# Patient Record
Sex: MALE | Race: WHITE | Employment: OTHER | URBAN - METROPOLITAN AREA
[De-identification: names, ages, dates, MRNs, and addresses within clinical notes are randomized per-mention and may not be internally consistent; named-entity substitution may affect disease eponyms.]

---

## 2017-01-05 PROCEDURE — 84153 ASSAY OF PSA TOTAL: CPT | Performed by: UROLOGY

## 2017-01-05 PROCEDURE — 80177 DRUG SCRN QUAN LEVETIRACETAM: CPT | Performed by: NURSE PRACTITIONER

## 2017-01-05 PROCEDURE — 84154 ASSAY OF PSA FREE: CPT | Performed by: UROLOGY

## 2017-01-12 PROCEDURE — 84702 CHORIONIC GONADOTROPIN TEST: CPT | Performed by: INTERNAL MEDICINE

## 2017-01-12 PROCEDURE — 83002 ASSAY OF GONADOTROPIN (LH): CPT | Performed by: INTERNAL MEDICINE

## 2017-01-12 PROCEDURE — 84402 ASSAY OF FREE TESTOSTERONE: CPT | Performed by: INTERNAL MEDICINE

## 2017-01-12 PROCEDURE — 84146 ASSAY OF PROLACTIN: CPT | Performed by: INTERNAL MEDICINE

## 2017-01-12 PROCEDURE — 84403 ASSAY OF TOTAL TESTOSTERONE: CPT | Performed by: INTERNAL MEDICINE

## 2017-01-12 PROCEDURE — 83001 ASSAY OF GONADOTROPIN (FSH): CPT | Performed by: INTERNAL MEDICINE

## 2017-04-14 PROCEDURE — 36415 COLL VENOUS BLD VENIPUNCTURE: CPT | Performed by: UROLOGY

## 2017-04-14 PROCEDURE — 84153 ASSAY OF PSA TOTAL: CPT | Performed by: UROLOGY

## 2017-11-02 PROBLEM — M48.02 CERVICAL STENOSIS OF SPINAL CANAL: Status: ACTIVE | Noted: 2017-11-02

## 2017-11-02 PROBLEM — M43.12 SPONDYLOLISTHESIS OF CERVICAL REGION: Status: ACTIVE | Noted: 2017-11-02

## 2018-02-06 PROBLEM — I48.91 ATRIAL FIBRILLATION, UNSPECIFIED TYPE (HCC): Status: ACTIVE | Noted: 2018-02-06

## 2018-03-06 PROBLEM — G89.29 CHRONIC BILATERAL LOW BACK PAIN WITHOUT SCIATICA: Status: ACTIVE | Noted: 2018-03-06

## 2018-03-06 PROBLEM — M54.50 CHRONIC BILATERAL LOW BACK PAIN WITHOUT SCIATICA: Status: ACTIVE | Noted: 2018-03-06

## 2018-03-06 PROBLEM — Z98.890 HISTORY OF LUMBAR SURGERY: Status: ACTIVE | Noted: 2018-03-06

## 2018-03-23 PROCEDURE — 84153 ASSAY OF PSA TOTAL: CPT | Performed by: INTERNAL MEDICINE

## 2018-03-23 PROCEDURE — 84154 ASSAY OF PSA FREE: CPT | Performed by: INTERNAL MEDICINE

## 2018-03-23 PROCEDURE — 80177 DRUG SCRN QUAN LEVETIRACETAM: CPT | Performed by: NURSE PRACTITIONER

## 2018-03-23 PROCEDURE — 81003 URINALYSIS AUTO W/O SCOPE: CPT | Performed by: INTERNAL MEDICINE

## 2018-03-29 PROCEDURE — 82746 ASSAY OF FOLIC ACID SERUM: CPT | Performed by: INTERNAL MEDICINE

## 2018-03-29 PROCEDURE — 82607 VITAMIN B-12: CPT | Performed by: INTERNAL MEDICINE

## 2018-03-29 PROCEDURE — 84165 PROTEIN E-PHORESIS SERUM: CPT | Performed by: INTERNAL MEDICINE

## 2018-03-29 PROCEDURE — 83883 ASSAY NEPHELOMETRY NOT SPEC: CPT | Performed by: INTERNAL MEDICINE

## 2018-03-29 PROCEDURE — 86334 IMMUNOFIX E-PHORESIS SERUM: CPT | Performed by: INTERNAL MEDICINE

## 2018-05-15 PROBLEM — G89.29 OTHER CHRONIC PAIN: Status: ACTIVE | Noted: 2018-05-15

## 2018-06-14 PROCEDURE — 80177 DRUG SCRN QUAN LEVETIRACETAM: CPT | Performed by: NURSE PRACTITIONER

## 2018-06-14 PROCEDURE — 36415 COLL VENOUS BLD VENIPUNCTURE: CPT | Performed by: NURSE PRACTITIONER

## 2019-01-10 ENCOUNTER — HOSPITAL ENCOUNTER (EMERGENCY)
Facility: HOSPITAL | Age: 74
Discharge: HOME | End: 2019-01-10
Attending: EMERGENCY MEDICINE
Payer: MEDICARE

## 2019-01-10 ENCOUNTER — APPOINTMENT (OUTPATIENT)
Dept: CT IMAGING | Facility: HOSPITAL | Age: 74
End: 2019-01-10
Attending: EMERGENCY MEDICINE
Payer: MEDICARE

## 2019-01-10 ENCOUNTER — APPOINTMENT (OUTPATIENT)
Dept: GENERAL RADIOLOGY | Facility: HOSPITAL | Age: 74
End: 2019-01-10
Attending: EMERGENCY MEDICINE
Payer: MEDICARE

## 2019-01-10 VITALS
DIASTOLIC BLOOD PRESSURE: 72 MMHG | HEART RATE: 59 BPM | BODY MASS INDEX: 23.03 KG/M2 | RESPIRATION RATE: 18 BRPM | TEMPERATURE: 98 F | OXYGEN SATURATION: 95 % | WEIGHT: 170 LBS | SYSTOLIC BLOOD PRESSURE: 117 MMHG | HEIGHT: 72 IN

## 2019-01-10 DIAGNOSIS — R07.81 PLEURITIC CHEST PAIN: ICD-10-CM

## 2019-01-10 DIAGNOSIS — J18.9 COMMUNITY ACQUIRED PNEUMONIA OF LEFT UPPER LOBE OF LUNG: Primary | ICD-10-CM

## 2019-01-10 DIAGNOSIS — R93.89 ABNORMAL CT OF THE CHEST: ICD-10-CM

## 2019-01-10 LAB
ALBUMIN SERPL-MCNC: 3.5 G/DL (ref 3.1–4.5)
ALBUMIN/GLOB SERPL: 0.9 {RATIO} (ref 1–2)
ALP LIVER SERPL-CCNC: 88 U/L (ref 45–117)
ALT SERPL-CCNC: 22 U/L (ref 17–63)
ANION GAP SERPL CALC-SCNC: 6 MMOL/L (ref 0–18)
AST SERPL-CCNC: 14 U/L (ref 15–41)
ATRIAL RATE: 71 BPM
BASOPHILS # BLD AUTO: 0.02 X10(3) UL (ref 0–0.1)
BASOPHILS NFR BLD AUTO: 0.2 %
BILIRUB SERPL-MCNC: 1.2 MG/DL (ref 0.1–2)
BUN BLD-MCNC: 23 MG/DL (ref 8–20)
BUN/CREAT SERPL: 17.4 (ref 10–20)
CALCIUM BLD-MCNC: 8.9 MG/DL (ref 8.3–10.3)
CHLORIDE SERPL-SCNC: 103 MMOL/L (ref 101–111)
CO2 SERPL-SCNC: 28 MMOL/L (ref 22–32)
CREAT BLD-MCNC: 1.32 MG/DL (ref 0.7–1.3)
EOSINOPHIL # BLD AUTO: 0.16 X10(3) UL (ref 0–0.3)
EOSINOPHIL NFR BLD AUTO: 1.3 %
ERYTHROCYTE [DISTWIDTH] IN BLOOD BY AUTOMATED COUNT: 11.7 % (ref 11.5–16)
GLOBULIN PLAS-MCNC: 3.9 G/DL (ref 2.8–4.4)
GLUCOSE BLD-MCNC: 147 MG/DL (ref 70–99)
HCT VFR BLD AUTO: 43.3 % (ref 37–53)
HGB BLD-MCNC: 14.7 G/DL (ref 13–17)
IMMATURE GRANULOCYTE COUNT: 0.08 X10(3) UL (ref 0–1)
IMMATURE GRANULOCYTE RATIO %: 0.6 %
LYMPHOCYTES # BLD AUTO: 0.78 X10(3) UL (ref 0.9–4)
LYMPHOCYTES NFR BLD AUTO: 6.3 %
M PROTEIN MFR SERPL ELPH: 7.4 G/DL (ref 6.4–8.2)
MCH RBC QN AUTO: 33.4 PG (ref 27–33.2)
MCHC RBC AUTO-ENTMCNC: 33.9 G/DL (ref 31–37)
MCV RBC AUTO: 98.4 FL (ref 80–99)
MONOCYTES # BLD AUTO: 1.53 X10(3) UL (ref 0.1–1)
MONOCYTES NFR BLD AUTO: 12.4 %
NEUTROPHIL ABS PRELIM: 9.74 X10 (3) UL (ref 1.3–6.7)
NEUTROPHILS # BLD AUTO: 9.74 X10(3) UL (ref 1.3–6.7)
NEUTROPHILS NFR BLD AUTO: 79.2 %
OSMOLALITY SERPL CALC.SUM OF ELEC: 290 MOSM/KG (ref 275–295)
P AXIS: 45 DEGREES
P-R INTERVAL: 194 MS
PLATELET # BLD AUTO: 188 10(3)UL (ref 150–450)
POTASSIUM SERPL-SCNC: 4.1 MMOL/L (ref 3.6–5.1)
Q-T INTERVAL: 380 MS
QRS DURATION: 104 MS
QTC CALCULATION (BEZET): 412 MS
R AXIS: -45 DEGREES
RBC # BLD AUTO: 4.4 X10(6)UL (ref 3.8–5.8)
RED CELL DISTRIBUTION WIDTH-SD: 42.5 FL (ref 35.1–46.3)
SODIUM SERPL-SCNC: 137 MMOL/L (ref 136–144)
T AXIS: 23 DEGREES
TROPONIN I SERPL-MCNC: <0.046 NG/ML (ref ?–0.05)
VENTRICULAR RATE: 71 BPM
WBC # BLD AUTO: 12.3 X10(3) UL (ref 4–13)

## 2019-01-10 PROCEDURE — 99285 EMERGENCY DEPT VISIT HI MDM: CPT | Performed by: EMERGENCY MEDICINE

## 2019-01-10 PROCEDURE — 93010 ELECTROCARDIOGRAM REPORT: CPT | Performed by: EMERGENCY MEDICINE

## 2019-01-10 PROCEDURE — 80053 COMPREHEN METABOLIC PANEL: CPT | Performed by: EMERGENCY MEDICINE

## 2019-01-10 PROCEDURE — 71046 X-RAY EXAM CHEST 2 VIEWS: CPT | Performed by: EMERGENCY MEDICINE

## 2019-01-10 PROCEDURE — 96360 HYDRATION IV INFUSION INIT: CPT | Performed by: EMERGENCY MEDICINE

## 2019-01-10 PROCEDURE — 93005 ELECTROCARDIOGRAM TRACING: CPT

## 2019-01-10 PROCEDURE — 84484 ASSAY OF TROPONIN QUANT: CPT | Performed by: EMERGENCY MEDICINE

## 2019-01-10 PROCEDURE — 71275 CT ANGIOGRAPHY CHEST: CPT | Performed by: EMERGENCY MEDICINE

## 2019-01-10 PROCEDURE — 85025 COMPLETE CBC W/AUTO DIFF WBC: CPT | Performed by: EMERGENCY MEDICINE

## 2019-01-10 PROCEDURE — 96361 HYDRATE IV INFUSION ADD-ON: CPT | Performed by: EMERGENCY MEDICINE

## 2019-01-10 RX ORDER — SODIUM CHLORIDE 9 MG/ML
INJECTION, SOLUTION INTRAVENOUS ONCE
Status: COMPLETED | OUTPATIENT
Start: 2019-01-10 | End: 2019-01-10

## 2019-01-10 RX ORDER — VITAMIN E 268 MG
400 CAPSULE ORAL DAILY
Status: ON HOLD | COMMUNITY
End: 2019-04-18

## 2019-01-10 RX ORDER — LEVOFLOXACIN 500 MG/1
500 TABLET, FILM COATED ORAL DAILY
Qty: 10 TABLET | Refills: 0 | Status: SHIPPED | OUTPATIENT
Start: 2019-01-10 | End: 2019-01-20

## 2019-01-10 NOTE — ED PROVIDER NOTES
Patient Seen in: [facility] Emergency Department    History   Patient presents with:  Upper Extremity Injury (musculoskeletal)    Stated Complaint: Upper extremity pain     HPI    Patient is a pleasant 16-year-old male, presenting for evaluation of at [address] [place] [address], POSSIBLE BIOPSY, POSSIBLE POLYPECTOMY [address]16 N/A 12/22/2009    Performed by Ashok Claude, MD at [facility]   • ESOPHAGOGASTRODUODENOSCOPY (EGD) N/A 1/29/2015    Performed by Ashok Claude, MD 53.8      Smokeless tobacco: Never Used    Alcohol use: Yes      Comment: SOCIAL    Drug use: No      Review of Systems    Positive for stated complaint: Upper extremity pain   Other systems are as noted in HPI. Constitutional and vital signs reviewed. components within normal limits   CBC W/ DIFFERENTIAL - Abnormal; Notable for the following components:    MCH 33.4 (*)     Neutrophil Absolute Prelim 9.74 (*)     Neutrophil Absolute 9.74 (*)     Lymphocyte Absolute 0.78 (*)     Monocyte Absolute 1.53 (*) Upper extremity pain  TECHNIQUE:  IV contrast-enhanced multislice CT angiography is performed through the pulmonary arterial anatomy. 3D volume renderings are generated. Dose reduction techniques were used.  Dose information is transmitted to the [facility] established, and blood was drawn and sent to the laboratory for further evaluation. The patient was attached to a cardiac monitor. An EKG was obtained and reviewed as noted above.  Patient was observed while the laboratory and radiology studies were obtaine

## 2019-01-10 NOTE — ED NOTES
Patient states has been more active recently taking the tree down and lifting his grandson the last few days.

## 2019-01-10 NOTE — ED INITIAL ASSESSMENT (HPI)
Patient here with left pectoral pain when he uses his left arm or takes a deep breath otherwise he experiences no pain at rest.  Onset 3 days ago, denies any shortness of breath, denies any radiation of pain.

## 2019-01-24 ENCOUNTER — HOSPITAL ENCOUNTER (INPATIENT)
Facility: HOSPITAL | Age: 74
LOS: 2 days | Discharge: HOME | DRG: 195 | End: 2019-01-26
Attending: INTERNAL MEDICINE | Admitting: INTERNAL MEDICINE
Payer: MEDICARE

## 2019-01-24 PROBLEM — R91.8 LUNG INFILTRATE: Status: ACTIVE | Noted: 2019-01-24

## 2019-01-24 LAB
ANION GAP SERPL CALC-SCNC: 6 MMOL/L (ref 0–18)
BASOPHILS # BLD AUTO: 0.02 X10(3) UL (ref 0–0.1)
BASOPHILS NFR BLD AUTO: 0.2 %
BUN BLD-MCNC: 24 MG/DL (ref 8–20)
BUN/CREAT SERPL: 18.5 (ref 10–20)
CALCIUM BLD-MCNC: 8.8 MG/DL (ref 8.3–10.3)
CHLORIDE SERPL-SCNC: 107 MMOL/L (ref 101–111)
CO2 SERPL-SCNC: 28 MMOL/L (ref 22–32)
CREAT BLD-MCNC: 1.3 MG/DL (ref 0.7–1.3)
EOSINOPHIL # BLD AUTO: 0.19 X10(3) UL (ref 0–0.3)
EOSINOPHIL NFR BLD AUTO: 2.1 %
ERYTHROCYTE [DISTWIDTH] IN BLOOD BY AUTOMATED COUNT: 11.8 % (ref 11.5–16)
GLUCOSE BLD-MCNC: 155 MG/DL (ref 70–99)
HCT VFR BLD AUTO: 41 % (ref 37–53)
HGB BLD-MCNC: 14.2 G/DL (ref 13–17)
IMMATURE GRANULOCYTE COUNT: 0.04 X10(3) UL (ref 0–1)
IMMATURE GRANULOCYTE RATIO %: 0.4 %
LYMPHOCYTES # BLD AUTO: 1.14 X10(3) UL (ref 0.9–4)
LYMPHOCYTES NFR BLD AUTO: 12.4 %
MCH RBC QN AUTO: 33.7 PG (ref 27–33.2)
MCHC RBC AUTO-ENTMCNC: 34.6 G/DL (ref 31–37)
MCV RBC AUTO: 97.4 FL (ref 80–99)
MONOCYTES # BLD AUTO: 0.62 X10(3) UL (ref 0.1–1)
MONOCYTES NFR BLD AUTO: 6.7 %
NEUTROPHIL ABS PRELIM: 7.22 X10 (3) UL (ref 1.3–6.7)
NEUTROPHILS # BLD AUTO: 7.22 X10(3) UL (ref 1.3–6.7)
NEUTROPHILS NFR BLD AUTO: 78.2 %
OSMOLALITY SERPL CALC.SUM OF ELEC: 299 MOSM/KG (ref 275–295)
PLATELET # BLD AUTO: 342 10(3)UL (ref 150–450)
POTASSIUM SERPL-SCNC: 3.7 MMOL/L (ref 3.6–5.1)
RBC # BLD AUTO: 4.21 X10(6)UL (ref 3.8–5.8)
RED CELL DISTRIBUTION WIDTH-SD: 41.6 FL (ref 35.1–46.3)
SODIUM SERPL-SCNC: 141 MMOL/L (ref 136–144)
WBC # BLD AUTO: 9.2 X10(3) UL (ref 4–13)

## 2019-01-24 PROCEDURE — 86641 CRYPTOCOCCUS ANTIBODY: CPT | Performed by: INTERNAL MEDICINE

## 2019-01-24 PROCEDURE — 85025 COMPLETE CBC W/AUTO DIFF WBC: CPT | Performed by: INTERNAL MEDICINE

## 2019-01-24 PROCEDURE — 86612 BLASTOMYCES ANTIBODY: CPT | Performed by: INTERNAL MEDICINE

## 2019-01-24 PROCEDURE — 86698 HISTOPLASMA ANTIBODY: CPT | Performed by: INTERNAL MEDICINE

## 2019-01-24 PROCEDURE — 86606 ASPERGILLUS ANTIBODY: CPT | Performed by: INTERNAL MEDICINE

## 2019-01-24 PROCEDURE — 87385 HISTOPLASMA CAPSUL AG IA: CPT | Performed by: INTERNAL MEDICINE

## 2019-01-24 PROCEDURE — 94640 AIRWAY INHALATION TREATMENT: CPT

## 2019-01-24 PROCEDURE — 86635 COCCIDIOIDES ANTIBODY: CPT | Performed by: INTERNAL MEDICINE

## 2019-01-24 PROCEDURE — 80048 BASIC METABOLIC PNL TOTAL CA: CPT | Performed by: INTERNAL MEDICINE

## 2019-01-24 PROCEDURE — 86480 TB TEST CELL IMMUN MEASURE: CPT | Performed by: INTERNAL MEDICINE

## 2019-01-24 RX ORDER — FLUTICASONE PROPIONATE 50 MCG
2 SPRAY, SUSPENSION (ML) NASAL NIGHTLY
Status: DISCONTINUED | OUTPATIENT
Start: 2019-01-24 | End: 2019-01-26

## 2019-01-24 RX ORDER — CARVEDILOL 6.25 MG/1
6.25 TABLET ORAL 2 TIMES DAILY WITH MEALS
Status: DISCONTINUED | OUTPATIENT
Start: 2019-01-24 | End: 2019-01-26

## 2019-01-24 RX ORDER — LEVETIRACETAM 500 MG/1
750 TABLET ORAL 2 TIMES DAILY
COMMUNITY
End: 2019-04-17

## 2019-01-24 RX ORDER — ACETAMINOPHEN 325 MG/1
650 TABLET ORAL EVERY 6 HOURS PRN
Status: DISCONTINUED | OUTPATIENT
Start: 2019-01-24 | End: 2019-01-26

## 2019-01-24 RX ORDER — ENOXAPARIN SODIUM 100 MG/ML
40 INJECTION SUBCUTANEOUS DAILY
Status: DISCONTINUED | OUTPATIENT
Start: 2019-01-24 | End: 2019-01-24

## 2019-01-24 RX ORDER — PANTOPRAZOLE SODIUM 40 MG/1
40 TABLET, DELAYED RELEASE ORAL
Status: DISCONTINUED | OUTPATIENT
Start: 2019-01-25 | End: 2019-01-26

## 2019-01-24 RX ORDER — ROSUVASTATIN CALCIUM 20 MG/1
10 TABLET, COATED ORAL NIGHTLY
Status: DISCONTINUED | OUTPATIENT
Start: 2019-01-24 | End: 2019-01-26

## 2019-01-24 RX ORDER — LEVETIRACETAM 500 MG/1
500 TABLET ORAL 2 TIMES DAILY
Status: DISCONTINUED | OUTPATIENT
Start: 2019-01-24 | End: 2019-01-24

## 2019-01-24 RX ORDER — POTASSIUM CHLORIDE 20 MEQ/1
40 TABLET, EXTENDED RELEASE ORAL ONCE
Status: COMPLETED | OUTPATIENT
Start: 2019-01-24 | End: 2019-01-24

## 2019-01-24 RX ORDER — ASPIRIN 81 MG/1
81 TABLET ORAL NIGHTLY
Status: DISCONTINUED | OUTPATIENT
Start: 2019-01-24 | End: 2019-01-26

## 2019-01-24 NOTE — CONSULTS
Pulmonary H&P/Consult     NAME: [address]: [id number] - MRN: [id number] - Age: 75year old - :  1945    Date of Admission: 2019 12:41 PM  Admission Diagnosis: pneumonia,r/o TB    Assessment/Plan:  1.  Pneumonia - completed course of lev • BACK SURGERY  8/2/12    L2-4 mis PLIF AND TWO OTHERS   • BACK SURGERY  11/19/2015   • CERVICAL EPIDURAL N/A 11/2/2017    Performed by Fabiola Drake MD at [address]   • CERVICAL EPIDURAL N/A 6/28/2013    Performed by Jagdish Morataya [place] [facility]   • TRANSFORAMINAL EPIDURAL - LUMBAR Right 2/14/2014    Performed by Dash Williamson MD at [facility]   • TRANSFORAMINAL EPIDURAL - LUMBAR Right 1/31/2014    Performed by Tiffany Rodrigez MD at [facility]

## 2019-01-24 NOTE — CONSULTS
INFECTIOUS DISEASE CONSULTATION    Justice Platt Patient Status:  Inpatient    1945 MRN [id number]   [facility] 7NE-A Attending Natalia Chapman MD   Hosp Day # 0 PCP Denise Holliday, POSSIBLE POLYPECTOMY 36440 N/A 12/22/2009    Performed by Kurt Goode MD at [address] [place] [address]   • ESOPHAGOGASTRODUODENOSCOPY (EGD) N/A 1/29/2015    Performed by Kurt Goode MD at [facility] ENDOSCOPY   • ESOPHAGOGASTRODUODENOSCOPY, POSSIBLE DILA tobacco. He reports that he drinks alcohol. He reports that he does not use drugs.     Allergies:    Ampicillin              RASH    Comment:Throat tight  Vibramycin Iv [Alba*    RASH    Medications:    Current Facility-Administered Medications:   •  Flutic distress. Alert and oriented x 3. Vital signs: Pulse:  [69] 69  Resp:  [14] 14  BP: (122)/(82) 122/82  HEENT: Moist mucous membranes. Extraocular muscles are intact. Neck: No lymphadenopathy. supple, no masses  Respiratory: Clear to auscultation bilateral Ivett Vaca  [phone]

## 2019-01-25 LAB
BASOPHIL BRONCHIAL WASHING: 0 %
EOSINOPHIL BRONCHIAL WASHING: 0 %
LYMPHOCYTE BRONCHIAL WASHING: 49 %
M TB TUBERC IFN-G BLD QL: NEGATIVE
M TB TUBERC IFN-G/MITOGEN IGNF BLD: 0 IU/ML
M TB TUBERC IFN-G/MITOGEN IGNF BLD: 0 IU/ML
M TB TUBERC IGNF/MITOGEN IGNF CONTROL: >10 IU/ML
MITOGEN IGNF BCKGRD COR BLD-ACNC: 0.03 IU/ML
MON/MACROPHAGE BRONCHIAL WASH: 20 %
NEUTROPHILS BRONCHIAL WASHING: 31 %
POTASSIUM SERPL-SCNC: 4 MMOL/L (ref 3.6–5.1)
RBC BRONCHIAL WASHING: 32 /MM3
TOTAL CELLS COUNTED: 100
WBC BRONCHIAL WASHING: 172 /MM3

## 2019-01-25 PROCEDURE — 0B9G8ZX DRAINAGE OF LEFT UPPER LUNG LOBE, VIA NATURAL OR ARTIFICIAL OPENING ENDOSCOPIC, DIAGNOSTIC: ICD-10-PCS | Performed by: INTERNAL MEDICINE

## 2019-01-25 PROCEDURE — 99152 MOD SED SAME PHYS/QHP 5/>YRS: CPT | Performed by: INTERNAL MEDICINE

## 2019-01-25 PROCEDURE — 87116 MYCOBACTERIA CULTURE: CPT | Performed by: INTERNAL MEDICINE

## 2019-01-25 PROCEDURE — 87070 CULTURE OTHR SPECIMN AEROBIC: CPT | Performed by: INTERNAL MEDICINE

## 2019-01-25 PROCEDURE — 87206 SMEAR FLUORESCENT/ACID STAI: CPT | Performed by: INTERNAL MEDICINE

## 2019-01-25 PROCEDURE — 89051 BODY FLUID CELL COUNT: CPT | Performed by: INTERNAL MEDICINE

## 2019-01-25 PROCEDURE — 87102 FUNGUS ISOLATION CULTURE: CPT | Performed by: INTERNAL MEDICINE

## 2019-01-25 PROCEDURE — 89050 BODY FLUID CELL COUNT: CPT | Performed by: INTERNAL MEDICINE

## 2019-01-25 PROCEDURE — 87205 SMEAR GRAM STAIN: CPT | Performed by: INTERNAL MEDICINE

## 2019-01-25 PROCEDURE — 87389 HIV-1 AG W/HIV-1&-2 AB AG IA: CPT | Performed by: INTERNAL MEDICINE

## 2019-01-25 PROCEDURE — 84132 ASSAY OF SERUM POTASSIUM: CPT | Performed by: HOSPITALIST

## 2019-01-25 PROCEDURE — 88312 SPECIAL STAINS GROUP 1: CPT | Performed by: INTERNAL MEDICINE

## 2019-01-25 PROCEDURE — 87071 CULTURE AEROBIC QUANT OTHER: CPT | Performed by: INTERNAL MEDICINE

## 2019-01-25 PROCEDURE — 88305 TISSUE EXAM BY PATHOLOGIST: CPT | Performed by: INTERNAL MEDICINE

## 2019-01-25 PROCEDURE — 88104 CYTOPATH FL NONGYN SMEARS: CPT | Performed by: INTERNAL MEDICINE

## 2019-01-25 PROCEDURE — 94640 AIRWAY INHALATION TREATMENT: CPT

## 2019-01-25 PROCEDURE — 94667 MNPJ CHEST WALL 1ST: CPT

## 2019-01-25 RX ORDER — CEFUROXIME AXETIL 500 MG/1
500 TABLET ORAL 2 TIMES DAILY
Qty: 20 TABLET | Refills: 0 | Status: SHIPPED | OUTPATIENT
Start: 2019-01-25 | End: 2019-02-04

## 2019-01-25 RX ORDER — MIDAZOLAM HYDROCHLORIDE 1 MG/ML
INJECTION INTRAMUSCULAR; INTRAVENOUS
Status: DISCONTINUED | OUTPATIENT
Start: 2019-01-25 | End: 2019-01-25 | Stop reason: HOSPADM

## 2019-01-25 RX ORDER — LIDOCAINE HYDROCHLORIDE 20 MG/ML
INJECTION, SOLUTION INFILTRATION; PERINEURAL
Status: DISCONTINUED | OUTPATIENT
Start: 2019-01-25 | End: 2019-01-25 | Stop reason: HOSPADM

## 2019-01-25 NOTE — PROGRESS NOTES
[facility] Hospitalist note    PCP: Penny Cuellar MD    Chief Complaint:  F/u for r/o TB    SUBJECTIVE:  Feeling well. Had bronch this morning. No sob/cough.  No fevers    OBJECTIVE:  Temp:  [97.8 °F (36.6 °C)-98.6 °F (37 °C)] 98.1 °F (36.7 °C)  Pulse:  [5 levETIRAcetam  750 mg Oral BID       acetaminophen       Assessment/Plan:     TIMOTHY infiltrate- admitted to r/o TB in light of sweats, weight loss  - admit for isolation  - bronch/BAL done this morning  - quantiferon gold and hiv neg.  Other studies pending

## 2019-01-25 NOTE — PROGRESS NOTES
[address] Patient Status:  Inpatient    1945 MRN [id number]   [facility] 7NE-A Attending Ismael Dover MD   Hosp Day # 1 PCP Chandler Thornton MD     Pulm / Critical Care Progress Note     S: feels ok.  Unable t 04/04/2014 1.18   07/27/2012 1.19     Creatinine (mg/dL)   Date Value   01/24/2019 1.30   01/10/2019 1.32 (H)   07/23/2018 1.18   07/05/2018 1.48 (H)   03/23/2018 1.11   ]    No results for input(s): ALKPHOS, ALT, AST in the last 168 hours.     Invalid in

## 2019-01-25 NOTE — H&P
.  CC:  R/o TB    PCP: Jim Mary MD    History of Present Illness: Patient is a 75year old male with PMH sig for afib on pradaxa, asthma, seizures who presents for rule out TB. Pt first started having L upper chest discomfort around 1/7.  Thought MD at [address] [place] [address]   • ESOPHAGOGASTRODUODENOSCOPY (EGD) N/A 1/29/2015    Performed by Es Celaya MD at [facility] ENDOSCOPY   • ESOPHAGOGASTRODUODENOSCOPY, POSSIBLE DILATION, POSSIBLE BIOSPY, POSSIBLE POLYPECTOMY N/A 12/22/2008    Performed by SARA (two) times daily. Disp:  Rfl:    vitamin E 400 UNITS Oral Cap Take 400 Units by mouth daily.  Disp:  Rfl:    CARVEDILOL 6.25 MG Oral Tab TAKE 1 TABLET(6.25 MG) BY MOUTH TWICE DAILY WITH MEALS Disp: 180 tablet Rfl: 1   Pantoprazole Sodium 40 MG Oral Tab EC 160 lb 0.9 oz (72.6 kg)   SpO2 98%   BMI 21.71 kg/m²     Gen- NAD, appears stated age  [de-identified]- NCAT, anicteric sclera, MMM, OP clear  Lymph- no cervical LAD  CV- RRR no murmurs.  No DEEDEE  Lungs- CTAB, good respiratory effort  Abd- soft, ntnd, no organomegaly, Dictated by: Rowdy Albarran MD on 1/10/2019 at 13:41     Approved by: Rowdy Albarran MD            Ct Angiography, Chest (cpt=71275)    Result Date: 1/10/2019  PROCEDURE:  CT ANGIOGRAPHY, CHEST (CPT=71275)  COMPARISON:  RAJESH CT ANGIOGRAPHY, CHEST (CP lobe masslike consolidative process concerning for an infectious process. Recommend followup to ensure resolution and to exclude an underlying mass.     Dictated by: Kishan Hendricks MD on 1/10/2019 at 12:52     Approved by: Kishan Hendricks MD               Available

## 2019-01-25 NOTE — PLAN OF CARE
RESPIRATORY - ADULT    • Achieves optimal ventilation and oxygenation Progressing            Received report at 0700. Patient alert and oriented x4. No complaints of pain. Went down for bronch this AM, no complications. Waiting for smear to come back.  IV a

## 2019-01-25 NOTE — OPERATIVE REPORT
Bronchoscopy procedure report    Preop diagnosis: pneumonia  Postop diagnosis:  same  Procedure performed: Bronchoscopy, Diagnostic  Bronchoalveolar lavage, BAL    Sedation used: 7 mg of versed, 100 mcg of fentanyl, topical lidocaine    Description of proc

## 2019-01-25 NOTE — PLAN OF CARE
Pt admitted to [address] [place] [address]. R/o TB so pt is on Airborne Isolation. Hx and meds noted. Pt is alert and oriented. No c/o. Oriented to precautions. Will cont to monitor.

## 2019-01-25 NOTE — PLAN OF CARE
Pt AOx4. RA, .  NSR. Pt no c/o pain. Airborne Precautions. Seizure Precautions. Pt on Keppra. Pt to be NPO after MN. Possible Bronch. Need to obtain AFB. Will continue to monitor.

## 2019-01-25 NOTE — PROGRESS NOTES
[facility]                INFECTIOUS DISEASE PROGRESS NOTE    Shane Sampson Patient Status:  Inpatient    1945 MRN [id number]   [facility] ENDOSCOPY Attending Raisa Verduzco MD   Hosp Day # 1 PCP Lucie Gonzalez MD       [facility] without sciatica     History of lumbar surgery     Other chronic pain     Lung infiltrate      ASSESSMENT/PLAN:  1.  TIMOTHY mass like infiltrate  Presented with CP/night sweats, completed 10d levaquin  SP BAL today, noted secretions, cx pending  Can given ceft

## 2019-01-25 NOTE — RESPIRATORY THERAPY NOTE
Patient given saline neb to help production of sputum, acapella also done with patient,  Some sputum coughed out. Sent to lab.

## 2019-01-25 NOTE — PLAN OF CARE
Per RT unable to get sputum. Dr Navid Metz notified. RT to try again in am. Pt to be npo after midnight in case of need for bronch to obtain specimen. Will cont to monitor. Pt on airborne Isolation for r/o TB.

## 2019-01-26 VITALS
RESPIRATION RATE: 18 BRPM | HEART RATE: 63 BPM | BODY MASS INDEX: 22 KG/M2 | DIASTOLIC BLOOD PRESSURE: 54 MMHG | OXYGEN SATURATION: 96 % | TEMPERATURE: 98 F | WEIGHT: 160.06 LBS | SYSTOLIC BLOOD PRESSURE: 89 MMHG

## 2019-01-26 LAB
HISTOPLASMA AG DETECTION,URINE: NOT DETECTED
HISTOPLASMA AG EIA, URINE: NOT DETECTED NG/ML

## 2019-01-26 NOTE — PLAN OF CARE
Discharge instructions, prescriptions, medications & follow-up appointments reviewed with pt and wife. Verbalizes understanding. IV SILVIA DC'd. Wife to drive pt home. To car ambulatory accompanied by wife.

## 2019-01-26 NOTE — PROGRESS NOTES
[facility] PULMONARY/CRITICAL CARE    S: No new events overnight.     Meds:  • cefTRIAXone  2 g Intravenous Q24H   • Dabigatran Etexilate Mesylate  150 mg Oral BID with meals   • Fluticasone Furoate-Vilanterol  1 puff Inhalation Daily   • aspirin  81 mg Oral Night two weeks later. There is a concern for TB given fevers, night sweats and weight loss. He does not have any tb risk factors  - Quant gold negative making TB much less likely   - induced sputums ordered but unable to produce sputum.  Had Bronch yesterday wit

## 2019-01-26 NOTE — PROGRESS NOTES
[facility] Hospitalist note    PCP: Dg Chacon MD    Chief Complaint:  F/u for r/o TB    SUBJECTIVE:  Feels well. No sob/dizziness/nausea.      OBJECTIVE:  Temp:  [98.1 °F (36.7 °C)-98.3 °F (36.8 °C)] 98.2 °F (36.8 °C)  Pulse:  [57-81] 62  Resp:  [16-18 Oral BID       acetaminophen       Assessment/Plan:     TIMOTHY infiltrate- admitted to r/o TB in light of sweats, weight loss  - admit for isolation  - bronch/BAL done 1/25  - quantiferon gold and hiv neg.  Other studies pending  - ID and pulm following     As

## 2019-01-26 NOTE — PLAN OF CARE
Assumed pt care at [address] [place] [address] for the night shift. Pt resting in bed comfortably. Denies any pain or kasandra.  VSS. Afebrile. Resp easy and non labored. O2 sats >92% on RA.NSR on tele. Up ad toni in the room. Toleartaing activities well. TB r/o isolation maintained. Will mo

## 2019-01-27 LAB — BLASTOMYCES ANTIBODY BY EIA, SER: 0.1 IV

## 2019-02-05 NOTE — DISCHARGE SUMMARY
[facility] Discharge Summary    Patient ID:  Efraín Taylor  [id number]  75year old  1/12/1945    Admit date: 1/24/2019  Discharge date and time: 1/26/19  Attending Physician: Lamar Kwong MD  Primary Care Physician: Lisseth Sanz MD     Adm given clinical concerns. He was unable to produce sputum so he underwent bronch with BAL. AFB stain was neg and quantiferon testing was neg so he was sent home. He was seen by ID and pulm and sent home on cefuroxime to treat empirically.  He will need f/u i for 10 days. Ask about: Should I take this medication?            Where to Get Your Medications      These medications were sent to [address], [phone], [address] [facility] renderings are generated. Dose reduction techniques were used. Dose information is transmitted to the [facility] [employer] [facility]) NRDR ([address]) which includes the Dose Index Registry.   PATIENT STATED HISTORY:(As transcrib Greater than 30 minutes Patient had opportunity to ask questions and state understand and agree with therapeutic plan as outlined

## 2019-02-11 NOTE — PROGRESS NOTES
FYI you will be seeing this patient on Friday for f/u, I had bronched him in the hospital to r/o TB. The cytology on the bal comments on rare atypical cells. Halley Taylor of course could not be more specific than that.  I suppose if repeat ct is clear then

## 2019-04-01 PROCEDURE — 80177 DRUG SCRN QUAN LEVETIRACETAM: CPT | Performed by: INTERNAL MEDICINE

## 2019-04-01 PROCEDURE — 81003 URINALYSIS AUTO W/O SCOPE: CPT | Performed by: INTERNAL MEDICINE

## 2019-04-18 ENCOUNTER — APPOINTMENT (OUTPATIENT)
Dept: GENERAL RADIOLOGY | Facility: HOSPITAL | Age: 74
DRG: 455 | End: 2019-04-18
Attending: ORTHOPAEDIC SURGERY
Payer: MEDICARE

## 2019-04-18 ENCOUNTER — ANESTHESIA (OUTPATIENT)
Dept: SURGERY | Facility: HOSPITAL | Age: 74
DRG: 455 | End: 2019-04-18
Payer: MEDICARE

## 2019-04-18 ENCOUNTER — ANESTHESIA EVENT (OUTPATIENT)
Dept: SURGERY | Facility: HOSPITAL | Age: 74
DRG: 455 | End: 2019-04-18
Payer: MEDICARE

## 2019-04-18 ENCOUNTER — HOSPITAL ENCOUNTER (INPATIENT)
Facility: HOSPITAL | Age: 74
LOS: 1 days | Discharge: HOME | DRG: 455 | End: 2019-04-19
Attending: ORTHOPAEDIC SURGERY | Admitting: ORTHOPAEDIC SURGERY
Payer: MEDICARE

## 2019-04-18 DIAGNOSIS — M48.061 SPINAL STENOSIS OF LUMBAR REGION WITHOUT NEUROGENIC CLAUDICATION: ICD-10-CM

## 2019-04-18 PROCEDURE — 0QN00ZZ RELEASE LUMBAR VERTEBRA, OPEN APPROACH: ICD-10-PCS | Performed by: ORTHOPAEDIC SURGERY

## 2019-04-18 PROCEDURE — 07DS3ZZ EXTRACTION OF VERTEBRAL BONE MARROW, PERCUTANEOUS APPROACH: ICD-10-PCS | Performed by: ORTHOPAEDIC SURGERY

## 2019-04-18 PROCEDURE — 85730 THROMBOPLASTIN TIME PARTIAL: CPT

## 2019-04-18 PROCEDURE — 0SB20ZZ EXCISION OF LUMBAR VERTEBRAL DISC, OPEN APPROACH: ICD-10-PCS | Performed by: ORTHOPAEDIC SURGERY

## 2019-04-18 PROCEDURE — 86901 BLOOD TYPING SEROLOGIC RH(D): CPT | Performed by: INTERNAL MEDICINE

## 2019-04-18 PROCEDURE — 95938 SOMATOSENSORY TESTING: CPT | Performed by: ORTHOPAEDIC SURGERY

## 2019-04-18 PROCEDURE — 86900 BLOOD TYPING SEROLOGIC ABO: CPT | Performed by: INTERNAL MEDICINE

## 2019-04-18 PROCEDURE — 95870 NDL EMG LMTD STD MUSC 1 XTR: CPT | Performed by: ORTHOPAEDIC SURGERY

## 2019-04-18 PROCEDURE — 85027 COMPLETE CBC AUTOMATED: CPT | Performed by: PHYSICIAN ASSISTANT

## 2019-04-18 PROCEDURE — 0SG1071 FUSION OF 2 OR MORE LUMBAR VERTEBRAL JOINTS WITH AUTOLOGOUS TISSUE SUBSTITUTE, POSTERIOR APPROACH, POSTERIOR COLUMN, OPEN APPROACH: ICD-10-PCS | Performed by: ORTHOPAEDIC SURGERY

## 2019-04-18 PROCEDURE — 4A11X4G MONITORING OF PERIPHERAL NERVOUS ELECTRICAL ACTIVITY, INTRAOPERATIVE, EXTERNAL APPROACH: ICD-10-PCS | Performed by: ORTHOPAEDIC SURGERY

## 2019-04-18 PROCEDURE — 86850 RBC ANTIBODY SCREEN: CPT | Performed by: INTERNAL MEDICINE

## 2019-04-18 PROCEDURE — 0SG00AJ FUSION OF LUMBAR VERTEBRAL JOINT WITH INTERBODY FUSION DEVICE, POSTERIOR APPROACH, ANTERIOR COLUMN, OPEN APPROACH: ICD-10-PCS | Performed by: ORTHOPAEDIC SURGERY

## 2019-04-18 PROCEDURE — 95940 IONM IN OPERATNG ROOM 15 MIN: CPT | Performed by: ORTHOPAEDIC SURGERY

## 2019-04-18 PROCEDURE — 76000 FLUOROSCOPY <1 HR PHYS/QHP: CPT | Performed by: ORTHOPAEDIC SURGERY

## 2019-04-18 DEVICE — 8.12-MODULUS TLIF 8X10X30 12: Type: IMPLANTABLE DEVICE | Site: BACK | Status: FUNCTIONAL

## 2019-04-18 DEVICE — OSTEOCEL PRO MEDIUM: Type: IMPLANTABLE DEVICE | Site: BACK | Status: FUNCTIONAL

## 2019-04-18 DEVICE — DURASEAL® EXACT SPINAL SEALANT SYSTEM 5ML 5 PACK
Type: IMPLANTABLE DEVICE | Site: BACK | Status: FUNCTIONAL
Brand: DURASEAL EXACT SPINAL SEALANT SYSTEM

## 2019-04-18 DEVICE — RELINE MAS TI ROD 5.5X40 LRDTC: Type: IMPLANTABLE DEVICE | Site: BACK | Status: FUNCTIONAL

## 2019-04-18 DEVICE — BONE GRAFT KIT 7510100 INFUSE X SMALL
Type: IMPLANTABLE DEVICE | Site: BACK | Status: FUNCTIONAL
Brand: INFUSE® BONE GRAFT

## 2019-04-18 DEVICE — RELINE MAS MOD SCREW 6.5X50 2C: Type: IMPLANTABLE DEVICE | Site: BACK | Status: FUNCTIONAL

## 2019-04-18 DEVICE — RELINE LCK SCRW 5.5 OPEN TULIP: Type: IMPLANTABLE DEVICE | Site: BACK | Status: FUNCTIONAL

## 2019-04-18 DEVICE — RELINE MAS MOD REDUCTION EXT: Type: IMPLANTABLE DEVICE | Site: BACK | Status: FUNCTIONAL

## 2019-04-18 DEVICE — RELINE MAS TI ROD 5.5X35 LRDTC: Type: IMPLANTABLE DEVICE | Site: BACK | Status: FUNCTIONAL

## 2019-04-18 RX ORDER — HYDROCODONE BITARTRATE AND ACETAMINOPHEN 5; 325 MG/1; MG/1
1 TABLET ORAL AS NEEDED
Status: DISCONTINUED | OUTPATIENT
Start: 2019-04-18 | End: 2019-04-18 | Stop reason: HOSPADM

## 2019-04-18 RX ORDER — MORPHINE SULFATE 4 MG/ML
4 INJECTION, SOLUTION INTRAMUSCULAR; INTRAVENOUS EVERY 2 HOUR PRN
Status: DISCONTINUED | OUTPATIENT
Start: 2019-04-18 | End: 2019-04-19

## 2019-04-18 RX ORDER — SODIUM CHLORIDE 9 MG/ML
INJECTION, SOLUTION INTRAVENOUS CONTINUOUS
Status: DISCONTINUED | OUTPATIENT
Start: 2019-04-18 | End: 2019-04-19

## 2019-04-18 RX ORDER — DIPHENHYDRAMINE HYDROCHLORIDE 50 MG/ML
25 INJECTION INTRAMUSCULAR; INTRAVENOUS EVERY 4 HOURS PRN
Status: DISCONTINUED | OUTPATIENT
Start: 2019-04-18 | End: 2019-04-19

## 2019-04-18 RX ORDER — TIZANIDINE 2 MG/1
2 TABLET ORAL 3 TIMES DAILY PRN
Status: DISCONTINUED | OUTPATIENT
Start: 2019-04-18 | End: 2019-04-19

## 2019-04-18 RX ORDER — METOCLOPRAMIDE HYDROCHLORIDE 5 MG/ML
10 INJECTION INTRAMUSCULAR; INTRAVENOUS EVERY 6 HOURS PRN
Status: DISCONTINUED | OUTPATIENT
Start: 2019-04-18 | End: 2019-04-19

## 2019-04-18 RX ORDER — CARVEDILOL 6.25 MG/1
6.25 TABLET ORAL 2 TIMES DAILY WITH MEALS
COMMUNITY
End: 2019-10-11

## 2019-04-18 RX ORDER — BISACODYL 10 MG
10 SUPPOSITORY, RECTAL RECTAL
Status: DISCONTINUED | OUTPATIENT
Start: 2019-04-18 | End: 2019-04-19

## 2019-04-18 RX ORDER — SODIUM PHOSPHATE, DIBASIC AND SODIUM PHOSPHATE, MONOBASIC 7; 19 G/133ML; G/133ML
1 ENEMA RECTAL ONCE AS NEEDED
Status: DISCONTINUED | OUTPATIENT
Start: 2019-04-18 | End: 2019-04-19

## 2019-04-18 RX ORDER — CELECOXIB 200 MG/1
200 CAPSULE ORAL ONCE
Status: COMPLETED | OUTPATIENT
Start: 2019-04-18 | End: 2019-04-18

## 2019-04-18 RX ORDER — LEVETIRACETAM 500 MG/1
750 TABLET ORAL 2 TIMES DAILY
COMMUNITY
End: 2020-09-04

## 2019-04-18 RX ORDER — BACITRACIN 50000 [USP'U]/1
INJECTION, POWDER, LYOPHILIZED, FOR SOLUTION INTRAMUSCULAR AS NEEDED
Status: DISCONTINUED | OUTPATIENT
Start: 2019-04-18 | End: 2019-04-18 | Stop reason: HOSPADM

## 2019-04-18 RX ORDER — DOCUSATE SODIUM 100 MG/1
100 CAPSULE, LIQUID FILLED ORAL 2 TIMES DAILY
Status: DISCONTINUED | OUTPATIENT
Start: 2019-04-18 | End: 2019-04-19

## 2019-04-18 RX ORDER — HYDROCODONE BITARTRATE AND ACETAMINOPHEN 5; 325 MG/1; MG/1
2 TABLET ORAL AS NEEDED
Status: DISCONTINUED | OUTPATIENT
Start: 2019-04-18 | End: 2019-04-18 | Stop reason: HOSPADM

## 2019-04-18 RX ORDER — POLYETHYLENE GLYCOL 3350 17 G/17G
17 POWDER, FOR SOLUTION ORAL DAILY PRN
Status: DISCONTINUED | OUTPATIENT
Start: 2019-04-18 | End: 2019-04-19

## 2019-04-18 RX ORDER — METOCLOPRAMIDE HYDROCHLORIDE 5 MG/ML
10 INJECTION INTRAMUSCULAR; INTRAVENOUS AS NEEDED
Status: DISCONTINUED | OUTPATIENT
Start: 2019-04-18 | End: 2019-04-18 | Stop reason: HOSPADM

## 2019-04-18 RX ORDER — ALBUTEROL SULFATE 2.5 MG/3ML
2.5 SOLUTION RESPIRATORY (INHALATION) AS NEEDED
Status: DISCONTINUED | OUTPATIENT
Start: 2019-04-18 | End: 2019-04-18 | Stop reason: HOSPADM

## 2019-04-18 RX ORDER — ONDANSETRON 2 MG/ML
4 INJECTION INTRAMUSCULAR; INTRAVENOUS EVERY 4 HOURS PRN
Status: DISCONTINUED | OUTPATIENT
Start: 2019-04-18 | End: 2019-04-19

## 2019-04-18 RX ORDER — SODIUM CHLORIDE, SODIUM LACTATE, POTASSIUM CHLORIDE, CALCIUM CHLORIDE 600; 310; 30; 20 MG/100ML; MG/100ML; MG/100ML; MG/100ML
INJECTION, SOLUTION INTRAVENOUS CONTINUOUS
Status: DISCONTINUED | OUTPATIENT
Start: 2019-04-18 | End: 2019-04-18 | Stop reason: HOSPADM

## 2019-04-18 RX ORDER — PANTOPRAZOLE SODIUM 40 MG/1
40 TABLET, DELAYED RELEASE ORAL
Status: DISCONTINUED | OUTPATIENT
Start: 2019-04-19 | End: 2019-04-19

## 2019-04-18 RX ORDER — ASPIRIN 81 MG/1
81 TABLET ORAL DAILY
Status: ON HOLD | COMMUNITY
End: 2019-04-19

## 2019-04-18 RX ORDER — DIPHENHYDRAMINE HCL 25 MG
25 CAPSULE ORAL EVERY 4 HOURS PRN
Status: DISCONTINUED | OUTPATIENT
Start: 2019-04-18 | End: 2019-04-19

## 2019-04-18 RX ORDER — SODIUM CHLORIDE, SODIUM LACTATE, POTASSIUM CHLORIDE, CALCIUM CHLORIDE 600; 310; 30; 20 MG/100ML; MG/100ML; MG/100ML; MG/100ML
INJECTION, SOLUTION INTRAVENOUS CONTINUOUS
Status: DISCONTINUED | OUTPATIENT
Start: 2019-04-18 | End: 2019-04-19

## 2019-04-18 RX ORDER — MEPERIDINE HYDROCHLORIDE 25 MG/ML
12.5 INJECTION INTRAMUSCULAR; INTRAVENOUS; SUBCUTANEOUS AS NEEDED
Status: DISCONTINUED | OUTPATIENT
Start: 2019-04-18 | End: 2019-04-18 | Stop reason: HOSPADM

## 2019-04-18 RX ORDER — BUPIVACAINE HYDROCHLORIDE AND EPINEPHRINE 5; 5 MG/ML; UG/ML
INJECTION, SOLUTION EPIDURAL; INTRACAUDAL; PERINEURAL AS NEEDED
Status: DISCONTINUED | OUTPATIENT
Start: 2019-04-18 | End: 2019-04-18 | Stop reason: HOSPADM

## 2019-04-18 RX ORDER — MULTIVITAMIN WITH FOLIC ACID 400 MCG
1 TABLET ORAL DAILY
Status: ON HOLD | COMMUNITY
End: 2019-04-19

## 2019-04-18 RX ORDER — MORPHINE SULFATE 4 MG/ML
2 INJECTION, SOLUTION INTRAMUSCULAR; INTRAVENOUS EVERY 2 HOUR PRN
Status: DISCONTINUED | OUTPATIENT
Start: 2019-04-18 | End: 2019-04-19

## 2019-04-18 RX ORDER — DIAZEPAM 5 MG/1
5 TABLET ORAL EVERY 6 HOURS PRN
Status: DISCONTINUED | OUTPATIENT
Start: 2019-04-18 | End: 2019-04-19

## 2019-04-18 RX ORDER — ONDANSETRON 2 MG/ML
4 INJECTION INTRAMUSCULAR; INTRAVENOUS ONCE
Status: DISCONTINUED | OUTPATIENT
Start: 2019-04-18 | End: 2019-04-18 | Stop reason: HOSPADM

## 2019-04-18 RX ORDER — ROSUVASTATIN CALCIUM 5 MG/1
10 TABLET, COATED ORAL NIGHTLY
Status: DISCONTINUED | OUTPATIENT
Start: 2019-04-18 | End: 2019-04-19

## 2019-04-18 RX ORDER — MORPHINE SULFATE 15 MG/1
15 TABLET ORAL EVERY 4 HOURS PRN
Status: DISCONTINUED | OUTPATIENT
Start: 2019-04-18 | End: 2019-04-19

## 2019-04-18 RX ORDER — MORPHINE SULFATE 4 MG/ML
1 INJECTION, SOLUTION INTRAMUSCULAR; INTRAVENOUS EVERY 2 HOUR PRN
Status: DISCONTINUED | OUTPATIENT
Start: 2019-04-18 | End: 2019-04-19

## 2019-04-18 RX ORDER — HYDROMORPHONE HYDROCHLORIDE 1 MG/ML
0.4 INJECTION, SOLUTION INTRAMUSCULAR; INTRAVENOUS; SUBCUTANEOUS EVERY 5 MIN PRN
Status: DISCONTINUED | OUTPATIENT
Start: 2019-04-18 | End: 2019-04-18 | Stop reason: HOSPADM

## 2019-04-18 RX ORDER — ONDANSETRON 2 MG/ML
4 INJECTION INTRAMUSCULAR; INTRAVENOUS AS NEEDED
Status: DISCONTINUED | OUTPATIENT
Start: 2019-04-18 | End: 2019-04-18 | Stop reason: HOSPADM

## 2019-04-18 RX ORDER — GABAPENTIN 600 MG/1
600 TABLET ORAL ONCE
Status: COMPLETED | OUTPATIENT
Start: 2019-04-18 | End: 2019-04-18

## 2019-04-18 RX ORDER — ACETAMINOPHEN 500 MG
1000 TABLET ORAL ONCE
Status: DISCONTINUED | OUTPATIENT
Start: 2019-04-18 | End: 2019-04-18

## 2019-04-18 RX ORDER — HYDROMORPHONE HYDROCHLORIDE 1 MG/ML
INJECTION, SOLUTION INTRAMUSCULAR; INTRAVENOUS; SUBCUTANEOUS
Status: COMPLETED
Start: 2019-04-18 | End: 2019-04-18

## 2019-04-18 RX ORDER — ROSUVASTATIN CALCIUM 10 MG/1
10 TABLET, COATED ORAL NIGHTLY
COMMUNITY
End: 2019-09-16 | Stop reason: ALTCHOICE

## 2019-04-18 RX ORDER — OXYCODONE HYDROCHLORIDE 10 MG/1
10 TABLET ORAL EVERY 4 HOURS PRN
Status: DISCONTINUED | OUTPATIENT
Start: 2019-04-18 | End: 2019-04-19

## 2019-04-18 RX ORDER — DIPHENHYDRAMINE HYDROCHLORIDE 50 MG/ML
12.5 INJECTION INTRAMUSCULAR; INTRAVENOUS AS NEEDED
Status: DISCONTINUED | OUTPATIENT
Start: 2019-04-18 | End: 2019-04-18 | Stop reason: HOSPADM

## 2019-04-18 RX ORDER — FLUTICASONE PROPIONATE 50 MCG
2 SPRAY, SUSPENSION (ML) NASAL 2 TIMES DAILY
Status: DISCONTINUED | OUTPATIENT
Start: 2019-04-18 | End: 2019-04-19

## 2019-04-18 RX ORDER — CARVEDILOL 6.25 MG/1
6.25 TABLET ORAL 2 TIMES DAILY WITH MEALS
Status: DISCONTINUED | OUTPATIENT
Start: 2019-04-18 | End: 2019-04-19

## 2019-04-18 RX ORDER — ALBUTEROL SULFATE 90 UG/1
2 AEROSOL, METERED RESPIRATORY (INHALATION) EVERY 4 HOURS PRN
Status: DISCONTINUED | OUTPATIENT
Start: 2019-04-18 | End: 2019-04-19

## 2019-04-18 RX ORDER — OXYCODONE HYDROCHLORIDE 5 MG/1
5 TABLET ORAL EVERY 4 HOURS PRN
Status: DISCONTINUED | OUTPATIENT
Start: 2019-04-18 | End: 2019-04-19

## 2019-04-18 RX ORDER — NALOXONE HYDROCHLORIDE 0.4 MG/ML
80 INJECTION, SOLUTION INTRAMUSCULAR; INTRAVENOUS; SUBCUTANEOUS AS NEEDED
Status: DISCONTINUED | OUTPATIENT
Start: 2019-04-18 | End: 2019-04-18 | Stop reason: HOSPADM

## 2019-04-18 NOTE — PROGRESS NOTES
S: Moderate back pain with some lateral thigh soreness. No new numbness or weakness. No headaches. Inspection:  Awake alert No acute distress.  No difficulty breathing     Blood pressure (!) 129/93, pulse 66, temperature 97.1 °F (36.2 °C), temperatur

## 2019-04-18 NOTE — ANESTHESIA PREPROCEDURE EVALUATION
PRE-OP EVALUATION    Patient Name: Vu Parham    Pre-op Diagnosis: Spinal stenosis of lumbar region without neurogenic claudication [M48.061]    Procedure(s):  Lumbar 4-Lumbar 5 revision decompression with fusion       Surgeon(s) and Role:     * Shannon Teixeira 1 CAP PO DAILY Disp:  Rfl:    SELENIUM 200 MCG OR CAPS 1 TABLET DAILY Disp:  Rfl:    FIBERCON 625 MG OR TABS 1 TABLET PO DAILY Disp:  Rfl:        Allergies: Doxycycline;  Ampicillin; Vibramycin Iv [Periostat]      Anesthesia Evaluation    Patient summary re (PAIN) Right 12/30/2013    Performed by Nunu Saunders MD at [address] Left 1/22/2016    Performed by Keven Romano MD at [address] [place] [address],[address] N/A 7/10/2012    Perform 4.10 04/01/2019     04/01/2019    CO2 25.8 04/01/2019    BUN 19.0 04/01/2019    CREATSERUM 1.25 04/01/2019    GLU 92 04/01/2019    CA 8.8 01/24/2019     Lab Results   Component Value Date    INR 1.2 04/01/2019         Airway      Mallampati: II  Mout

## 2019-04-18 NOTE — H&P
Patient would like to proceed with the elective L4-5 Revision decompression and interbody fusion.     Risks included but not limited to infection, blood loss, dural injury, reherniation, nerve injury possible need for future surgery and no gaurantee of reso Tab EC Take one tablet by mouth daily before food in morning.  (Patient taking differently: Take 40 mg by mouth every morning before breakfast. Take one tablet by mouth daily before food in morning. ) Disp: 90 tablet Rfl: 1   BREO ELLIPTA 100-25 MCG/INH Inh Performed by Glen Newton MD at [address] [facility] 24 N/A 11/2/2017     Performed by Scooter Barroso MD at [address] [facility] N/A 6/28/2013     Performed by Millicent Lake MD at [address] [facility] PAIN MANAGEMENT   • TRANSFORAMINAL EPIDURAL - LUMBAR Right 2/14/2014     Performed by London Bosch MD at [address]   • TRANSFORAMINAL EPIDURAL - LUMBAR Right 1/31/2014     Performed by Ana Rodney MD at [address] [facility] anemia; denies bruising or excessive bleeding  ENDOCRINE: denies excessive thirst or urination; denies unexpected wt gain or wt loss  ALLERGY/IMM.: denies food or seasonal allergies     Immunization History  Administered            Date(s) Administered Exam:     Patient Active Problem List:     Benign prostatic hyperplasia with urinary retention     Seizure ([address]     Atrial fibrillation, unspecified type ([address]     Chronic bilateral low back pain without sciatica              Paul Gonzalez is scheduled to have a L4

## 2019-04-18 NOTE — ANESTHESIA POSTPROCEDURE EVALUATION
[address] Patient Status:  Surgery Admit - Inpt   Age/Gender 75year old male MRN [id number]   Location [address] [facility] Attending Delmis Adames MD   Hosp Day # 0 PCP Thania Nieto MD       Anesthesia Post

## 2019-04-18 NOTE — OR PREOP
Spoke to Infection Control. Patient was tested for TB and was cleared after having 1 negative per his pulmonologist. No isolation required.

## 2019-04-19 VITALS
SYSTOLIC BLOOD PRESSURE: 126 MMHG | TEMPERATURE: 98 F | DIASTOLIC BLOOD PRESSURE: 72 MMHG | BODY MASS INDEX: 23.03 KG/M2 | RESPIRATION RATE: 18 BRPM | HEART RATE: 62 BPM | HEIGHT: 72 IN | OXYGEN SATURATION: 95 % | WEIGHT: 170 LBS

## 2019-04-19 PROCEDURE — 85025 COMPLETE CBC W/AUTO DIFF WBC: CPT | Performed by: HOSPITALIST

## 2019-04-19 PROCEDURE — 80048 BASIC METABOLIC PNL TOTAL CA: CPT | Performed by: HOSPITALIST

## 2019-04-19 PROCEDURE — 97535 SELF CARE MNGMENT TRAINING: CPT

## 2019-04-19 PROCEDURE — 97165 OT EVAL LOW COMPLEX 30 MIN: CPT

## 2019-04-19 PROCEDURE — 97161 PT EVAL LOW COMPLEX 20 MIN: CPT

## 2019-04-19 RX ORDER — ASPIRIN 81 MG/1
81 TABLET ORAL DAILY
Qty: 1 TABLET | Refills: 0 | Status: SHIPPED | OUTPATIENT
Start: 2019-04-23 | End: 2021-10-18

## 2019-04-19 RX ORDER — HYDROCODONE BITARTRATE AND ACETAMINOPHEN 10; 325 MG/1; MG/1
1 TABLET ORAL EVERY 4 HOURS PRN
Status: DISCONTINUED | OUTPATIENT
Start: 2019-04-19 | End: 2019-04-19

## 2019-04-19 NOTE — CERTIFICATION
**Certification    PHYSICIAN Certification of Need for Inpatient Hospitalization    Based on the his current state of illness, Isak Hardy requires inpatient hospitalization for his surgery

## 2019-04-19 NOTE — PLAN OF CARE
NURSING DISCHARGE NOTE    Discharged 358 via wheelchair. Accompanied by by wife and transport  Belongings all taken with pateint. All discharge instructions verbalized. Pt and wife verbalize understanding.    Dressing change completed and educated

## 2019-04-19 NOTE — OCCUPATIONAL THERAPY NOTE
OCCUPATIONAL THERAPY QUICK EVALUATION - INPATIENT    Room Number: 358/358-A  Evaluation Date: 4/19/2019     Type of Evaluation: Quick Eval  Presenting Problem: s/p L4-5 revision decompression/fusion     Physician Order: IP Consult to Occupational Therapy POLYPECTOMY 49684 N/A 11/3/2016    Performed by Noah Cowart MD at [address], POSSIBLE BIOPSY, POSSIBLE POLYPECTOMY [address] N/A 12/22/2009    Performed by Ewa Sosa MD at [address] [facility]  Home Layout: Two level  Lives With: Spouse    Toilet and Equipment: Toilet riser  Shower/Tub and Equipment: Walk-in shower; Tub-shower combo; Shower chair  Other Equipment: Other (Comment)(W/C, tori, RW )    Occupation/Status: Retired   Hand Dominanc Independent    Skilled Therapy Provided: Pt was found sitting up in a chair. Pt was able to recite spine precautions and demo good understanding. Pt performed a sit><stand without an AD independently.  Pt performed functional mobility with PT services witho Occupational Therapy services. Please re-order if a new functional limitation presents during this admission.     Patient was able to achieve the following goals:  Patient able to toilet transfer: safely and independently  Patient able to dress lower extre

## 2019-04-19 NOTE — PROGRESS NOTES
S: Moderate back pain with no leg pain. No numbness or weakness. He has no headaches. He would like to go home today. Inspection:  Awake alert No acute distress.  No difficulty breathing     Blood pressure 117/66, pulse 55, temperature 97.9 °F (36.6 °

## 2019-04-19 NOTE — PLAN OF CARE
Problem: PAIN - ADULT  Goal: Verbalizes/displays adequate comfort level or patient's stated pain goal  Description  INTERVENTIONS:  - Encourage pt to monitor pain and request assistance  - Assess pain using appropriate pain scale  - Administer analgesics well managed with oral medications. Denies numbness and tingling in BLE. Patient walked in halls with chair back brace. Voids freely. Hx. Of seizures and AFib. On tele and seizure precautions initiated. Tolerating general diet. Fall protocol initiated.  Helen Sanchez

## 2019-04-19 NOTE — PLAN OF CARE
Problem: PAIN - ADULT  Goal: Verbalizes/displays adequate comfort level or patient's stated pain goal  Description  INTERVENTIONS:  - Encourage pt to monitor pain and request assistance  - Assess pain using appropriate pain scale  - Administer analgesics Discharge  4/19/2019 1034 by Rachel Machado RN  Outcome: Progressing  Goal: Patient/Family Short Term Goal  Description  Patient's Short Term Goal: GO HOME TOMORROW    Interventions:   - GET UP AND START WALKING, PAIN MEDS, THERAPY  - See additional Care

## 2019-04-19 NOTE — PROGRESS NOTES
[facility] hospitalist daily note  Patient was seen/examined on 4/19/19    S; no chest pain, no SOB, no abd pain, no nausea/emesis  Pain is controlled at the surgery site  + urinating and passed gas  Tolerating diet  Wants to be discharged    Medications in Epic

## 2019-04-19 NOTE — PLAN OF CARE
Patient admitted to unit from PACU at approx 1715 via bed and transport. Spouse present at bedside  Welcomed and oriented to unit, order sets, POC, safety, call and phone systems. Call don't fall and ankle exercises. Discussed pain management POC.  Discusse

## 2019-04-19 NOTE — CONSULTS
[facility] hospitalist initial consult note  PCP Quiana Bradford MD  Consulted at the request of Dr. Fatoumata Herndon  Reason for consult medical co-management  HPI 74 yo male with multiple medical problems including but not limited to HTN, A-fib, HL, asthma, spinal steno • ESOPHAGOGASTRODUODENOSCOPY (EGD) N/A 1/29/2015    Performed by Erick Morgan MD at [facility] ENDOSCOPY   • ESOPHAGOGASTRODUODENOSCOPY, POSSIBLE DILATION, POSSIBLE BIOSPY, POSSIBLE POLYPECTOMY N/A 12/22/2008    Performed by Erick Morgan MD at [address] [place] [address] level: Not on file    Occupational History      Occupation: retired [employer].      Social Needs      Financial resource strain: Not on file      Food insecurity:        Worry: Not on file        Inability: Not on file      Transportation needs: (PRADAXA) 150 MG Oral Cap Take 150 mg by mouth 2 (two) times daily. Disp:  Rfl:    Rosuvastatin Calcium 10 MG Oral Tab Take 10 mg by mouth nightly. Disp:  Rfl:    levETIRAcetam 500 MG Oral Tab Take 750 mg by mouth 2 (two) times daily.  Disp:  Rfl:    Acetam 13.9   HCT 40.4   MCV 96.2   MCH 33.1   MCHC 34.4   RDW 11.9   WBC 10.3   .0     Xray fluoroscopy personally reviewed result in Epic  CONCLUSION: Intraoperative images for operative control.             Assessmnet/plan    74 yo male with multiple med

## 2019-04-19 NOTE — PLAN OF CARE
Problem: PAIN - ADULT  Goal: Verbalizes/displays adequate comfort level or patient's stated pain goal  Description  INTERVENTIONS:  - Encourage pt to monitor pain and request assistance  - Assess pain using appropriate pain scale  - Administer analgesics Functional Mobility  Goal: Achieve highest/safest level of mobility/gait  Description  Interventions:  - Assess patient's functional ability and stability  - Promote increasing activity/tolerance for mobility and gait  - Educate and engage patient/family i

## 2019-04-19 NOTE — PHYSICAL THERAPY NOTE
PHYSICAL THERAPY QUICK EVALUATION - INPATIENT    Room Number: 358/358-A  Evaluation Date: 4/19/2019  Presenting Problem: s/p L4-L5 revision decompression with fusion and BMP on 4/18/2019  Physician Order: PT Eval and Treat    Problem List  Active Problem ESOPHAGOGASTRODUODENOSCOPY, POSSIBLE DILATION, POSSIBLE BIOSPY, POSSIBLE POLYPECTOMY N/A 12/22/2008    Performed by Julio Simpson MD at [facility]   • HEMORRHOIDECTOMY  2008   • HERNIA SURGERY     • HIP INJECTION (PAIN) Right 12/30/2013 back surgeries and is comfortable with the spine precautions, log roll technique, and brace management.     SUBJECTIVE  \"I want to get back to playing golf\"    OBJECTIVE  Precautions: Spine  Fall Risk: Standard fall risk    WEIGHT BEARING RESTRICTION  Naima Coma precautions 3/3 and able to name all three before and after session. Pt educated and aware of log roll technique. Pt educated on brace management, pt is comfortable with brace from previous experiences. Pt performed sit>stand w/o Ad independently.  Pt amb 3 evaluation, patient's clinical presentation is stable and overall evaluation complexity is considered low. PT Discharge Recommendations: Home    PLAN  Patient has been evaluated and presents with no skilled Physical Therapy needs at this time.   Patient di

## 2019-04-30 NOTE — OPERATIVE REPORT
[facility]    PATIENT'S NAME: Cosem Chadwick   ATTENDING PHYSICIAN: Mariusz Thorpe M.D. OPERATING PHYSICIAN: Mariusz Thorpe M.D.    PATIENT ACCOUNT#:   [de-identified]    LOCATION:  [address] [place] [address]  MEDICAL RECORD #:   [id number]       DATE OF BIRTH:  [birth year] fluoroscopy was wheeled in. We marked pedicles at all levels outlined above bilaterally.   Two separate incisions 1-1/2 fingerbreadths lateral to this approximately an inch and a half long were made with a larger incision on the patient's more symptomatic between pelvic incidence and lumbar lordosis was made, thus documenting clinically significant kyphosis. We did explore the fusion using curettes and pituitaries from L2 to L5. Each was deemed solid at L2-3 and L3-4.   L4-5 had a clear pseudoarthrosis, the exiting nerve root and remove redundant annulus and any herniated nucleus pulposus. Because the patient had prior surgery at this level, an extensive lysis of adhesions ventrally and dorsally was required to mobilize the nerve roots.   This required fi contralateral side. Then, with dilators in place, the screws were placed. Instrumentation was placed on the right over K-wires in standard technique and fluoroscopy. Screws were advanced under fluoroscopy.   All screws were tested using real-time neuro

## 2019-09-26 PROBLEM — R91.8 LUNG NODULES: Status: ACTIVE | Noted: 2019-09-26

## 2019-09-26 PROBLEM — R91.8 LUNG INFILTRATE: Status: RESOLVED | Noted: 2019-01-24 | Resolved: 2019-09-26

## 2020-06-03 PROBLEM — I42.8 NON-ISCHEMIC CARDIOMYOPATHY (HCC): Status: ACTIVE | Noted: 2020-06-03

## 2020-06-03 PROBLEM — G89.29 OTHER CHRONIC PAIN: Status: RESOLVED | Noted: 2018-05-15 | Resolved: 2020-06-03

## 2021-01-12 PROBLEM — Z96.89 S/P INSERTION OF SPINAL CORD STIMULATOR: Status: ACTIVE | Noted: 2021-01-12

## 2021-03-05 DIAGNOSIS — Z23 NEED FOR VACCINATION: ICD-10-CM

## 2022-01-11 ENCOUNTER — APPOINTMENT (OUTPATIENT)
Dept: GENERAL RADIOLOGY | Age: 77
End: 2022-01-11
Attending: NURSE PRACTITIONER
Payer: MEDICARE

## 2022-01-11 ENCOUNTER — HOSPITAL ENCOUNTER (OUTPATIENT)
Age: 77
Discharge: HOME | End: 2022-01-11
Payer: MEDICARE

## 2022-01-11 VITALS
DIASTOLIC BLOOD PRESSURE: 80 MMHG | TEMPERATURE: 98 F | HEART RATE: 56 BPM | RESPIRATION RATE: 16 BRPM | OXYGEN SATURATION: 95 % | SYSTOLIC BLOOD PRESSURE: 149 MMHG

## 2022-01-11 DIAGNOSIS — S63.502A SPRAIN OF LEFT WRIST, INITIAL ENCOUNTER: ICD-10-CM
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
## 2022-01-11 PROCEDURE — 73110 X-RAY EXAM OF WRIST: CPT | Performed by: NURSE PRACTITIONER

## 2022-01-11 PROCEDURE — 73130 X-RAY EXAM OF HAND: CPT | Performed by: NURSE PRACTITIONER

## 2022-01-11 PROCEDURE — 73030 X-RAY EXAM OF SHOULDER: CPT | Performed by: NURSE PRACTITIONER

## 2022-01-11 PROCEDURE — L3908 WHO COCK-UP NONMOLDE PRE OTS: HCPCS | Performed by: NURSE PRACTITIONER

## 2022-01-11 PROCEDURE — 99203 OFFICE O/P NEW LOW 30 MIN: CPT | Performed by: NURSE PRACTITIONER

## 2022-01-11 PROCEDURE — 90715 TDAP VACCINE 7 YRS/> IM: CPT | Performed by: NURSE PRACTITIONER

## 2022-01-11 PROCEDURE — 90471 IMMUNIZATION ADMIN: CPT | Performed by: NURSE PRACTITIONER

## 2022-01-11 PROCEDURE — 73080 X-RAY EXAM OF ELBOW: CPT | Performed by: NURSE PRACTITIONER

## 2022-01-11 NOTE — ED PROVIDER NOTES
Patient [facility]: [facility]      History   Patient presents with:  Arm Pain    Stated Complaint: left shoulder pain from fall    Subjective: This is a 34-year-old male with below stated medical history.   Presents to immediate care & LOCLZ[facility] NDL/CATH SPI DX/THER [facility]  2/24/2012    Procedure: LUMBAR EPIDURAL;  Surgeon: Sunitha Martins MD;  Location: [address] [place] [address] & Raffy Billings NDL/CATH SPI DX/THER [facility]  6/19/2012    Procedure: LUMBAR EPIDURAL;  Surgeon: Demian Pena W/WO CONTRAST, DX/THERAPEUTIC SUBSTANCE, EPIDURAL/SUBARACHNOID; LUMBAR/SACRAL  6/19/2012    Procedure: LUMBAR EPIDURAL;  Surgeon: Natasha Hazel MD;  Location: [facility] PAIN MANAGEMENT   • INJECTION, W/WO CONTRAST, DX/THERAPEUTIC SUBSTANCE, EPIDURAL EVENTS Left 1/22/2016    Procedure: LAPAROSCOPIC INGUINAL HERNIORRAPHY;  Surgeon: Derrick Jeong MD;  Location: [address] [place] [address]   • PATIENT WITH PREOPERATIVE ORDER FOR IV ANTIBIOTIC SURGICAL SITE INFECT Left 1/22/2016    Procedure: LAPAROSCOPIC I 1 Glasses of wine per week      Comment: every few weeks     Drug use: No             Review of Systems   Constitutional: Negative for chills and fever. HENT: Negative for congestion and sore throat.     Respiratory: Negative for cough, shortness of breat Conjunctivae normal.   Cardiovascular:      Rate and Rhythm: Normal rate. Pulmonary:      Effort: Pulmonary effort is normal.   Musculoskeletal:      Left shoulder: No swelling, deformity, effusion, laceration, tenderness, bony tenderness or crepitus.  Asif Pod Wound was cleansed and covered in antibiotic ointment with dressing. DC home. Resting and icing. Tylenol for pain. Orthopedic follow-up in 1 week. Patient was advised to purchase an over-the-counter elbow sleeve for comfort.   Wear elbow sleeve and w

## 2022-01-11 NOTE — ED INITIAL ASSESSMENT (HPI)
1500 Pt silpped on ice and landed on his left side.   Pt c/o pain and limited ROM of left arm, Pt c/o pain in left shoulder, bicep, forearm and wrist.    Denies: LOC, N/V/D, SOB, CP

## 2023-10-22 ENCOUNTER — APPOINTMENT (OUTPATIENT)
Dept: CT IMAGING | Facility: HOSPITAL | Age: 78
DRG: 872 | End: 2023-10-22
Attending: HOSPITALIST

## 2023-10-22 ENCOUNTER — HOSPITAL ENCOUNTER (INPATIENT)
Facility: HOSPITAL | Age: 78
LOS: 3 days | Discharge: HOME | DRG: 872 | End: 2023-10-25
Attending: HOSPITALIST | Admitting: HOSPITALIST

## 2023-10-22 PROBLEM — N39.0 UTI (URINARY TRACT INFECTION): Status: ACTIVE | Noted: 2023-10-22

## 2023-10-22 LAB
LACTATE SERPL-SCNC: 2.6 MMOL/L (ref 0.4–2)
LACTATE SERPL-SCNC: 2.7 MMOL/L (ref 0.4–2)
LACTATE SERPL-SCNC: 2.7 MMOL/L (ref 0.4–2)

## 2023-10-22 PROCEDURE — 83605 ASSAY OF LACTIC ACID: CPT | Performed by: HOSPITALIST

## 2023-10-22 PROCEDURE — 87040 BLOOD CULTURE FOR BACTERIA: CPT | Performed by: HOSPITALIST

## 2023-10-22 PROCEDURE — 74176 CT ABD & PELVIS W/O CONTRAST: CPT | Performed by: HOSPITALIST

## 2023-10-22 RX ORDER — PANTOPRAZOLE SODIUM 40 MG/1
40 TABLET, DELAYED RELEASE ORAL
Status: DISCONTINUED | OUTPATIENT
Start: 2023-10-23 | End: 2023-10-25

## 2023-10-22 RX ORDER — IBUPROFEN 400 MG/1
400 TABLET ORAL EVERY 6 HOURS PRN
Status: DISCONTINUED | OUTPATIENT
Start: 2023-10-22 | End: 2023-10-25

## 2023-10-22 RX ORDER — ACETAMINOPHEN 500 MG
500 TABLET ORAL EVERY 4 HOURS PRN
Status: DISCONTINUED | OUTPATIENT
Start: 2023-10-22 | End: 2023-10-25

## 2023-10-22 RX ORDER — BISACODYL 10 MG
10 SUPPOSITORY, RECTAL RECTAL
Status: DISCONTINUED | OUTPATIENT
Start: 2023-10-22 | End: 2023-10-25

## 2023-10-22 RX ORDER — DABIGATRAN ETEXILATE 150 MG/1
150 CAPSULE ORAL 2 TIMES DAILY
Status: DISCONTINUED | OUTPATIENT
Start: 2023-10-22 | End: 2023-10-25

## 2023-10-22 RX ORDER — CARVEDILOL 3.12 MG/1
3.12 TABLET ORAL 2 TIMES DAILY WITH MEALS
Status: DISCONTINUED | OUTPATIENT
Start: 2023-10-22 | End: 2023-10-25

## 2023-10-22 RX ORDER — FLUTICASONE PROPIONATE 50 MCG
2 SPRAY, SUSPENSION (ML) NASAL 2 TIMES DAILY
Status: DISCONTINUED | OUTPATIENT
Start: 2023-10-22 | End: 2023-10-25

## 2023-10-22 RX ORDER — METOCLOPRAMIDE HYDROCHLORIDE 5 MG/ML
10 INJECTION INTRAMUSCULAR; INTRAVENOUS EVERY 8 HOURS PRN
Status: DISCONTINUED | OUTPATIENT
Start: 2023-10-22 | End: 2023-10-25

## 2023-10-22 RX ORDER — SODIUM CHLORIDE 9 MG/ML
INJECTION, SOLUTION INTRAVENOUS CONTINUOUS
Status: DISCONTINUED | OUTPATIENT
Start: 2023-10-22 | End: 2023-10-25

## 2023-10-22 RX ORDER — ENEMA 19; 7 G/133ML; G/133ML
1 ENEMA RECTAL ONCE AS NEEDED
Status: DISCONTINUED | OUTPATIENT
Start: 2023-10-22 | End: 2023-10-25

## 2023-10-22 RX ORDER — SODIUM CHLORIDE 9 MG/ML
INJECTION, SOLUTION INTRAVENOUS CONTINUOUS
Status: DISCONTINUED | OUTPATIENT
Start: 2023-10-22 | End: 2023-10-22

## 2023-10-22 RX ORDER — POLYETHYLENE GLYCOL 3350 17 G/17G
17 POWDER, FOR SOLUTION ORAL DAILY PRN
Status: DISCONTINUED | OUTPATIENT
Start: 2023-10-22 | End: 2023-10-25

## 2023-10-22 RX ORDER — FLUTICASONE PROPIONATE 50 MCG
2 SPRAY, SUSPENSION (ML) NASAL 2 TIMES DAILY
Status: DISCONTINUED | OUTPATIENT
Start: 2023-10-22 | End: 2023-10-22

## 2023-10-22 RX ORDER — ROSUVASTATIN CALCIUM 10 MG/1
10 TABLET, COATED ORAL NIGHTLY
Status: DISCONTINUED | OUTPATIENT
Start: 2023-10-22 | End: 2023-10-25

## 2023-10-22 RX ORDER — HEPARIN SODIUM 5000 [USP'U]/ML
5000 INJECTION, SOLUTION INTRAVENOUS; SUBCUTANEOUS EVERY 8 HOURS SCHEDULED
Status: DISCONTINUED | OUTPATIENT
Start: 2023-10-22 | End: 2023-10-22

## 2023-10-22 RX ORDER — FLUTICASONE FUROATE AND VILANTEROL 100; 25 UG/1; UG/1
1 POWDER RESPIRATORY (INHALATION) DAILY
Status: DISCONTINUED | OUTPATIENT
Start: 2023-10-23 | End: 2023-10-25

## 2023-10-22 RX ORDER — SENNOSIDES 8.6 MG
17.2 TABLET ORAL NIGHTLY PRN
Status: DISCONTINUED | OUTPATIENT
Start: 2023-10-22 | End: 2023-10-25

## 2023-10-22 RX ORDER — SENNOSIDES 8.6 MG
1300 CAPSULE ORAL 2 TIMES DAILY
COMMUNITY

## 2023-10-22 RX ORDER — ONDANSETRON 2 MG/ML
4 INJECTION INTRAMUSCULAR; INTRAVENOUS EVERY 6 HOURS PRN
Status: DISCONTINUED | OUTPATIENT
Start: 2023-10-22 | End: 2023-10-25

## 2023-10-22 NOTE — PLAN OF CARE
Patient arrived to the floor around 1300. Admission navigator complete. Patient oriented to room. A&Ox4. VSS. Tele-NSR. Spo2>90% on RA. Up ad toni. Febrile throughout shift, see flowsheets and MAR for temps and interventions. IV abx. Blood cultures pending. CT of the abdomen to be done. Seizure precautions in place. Patient denies n/v/d, SOB or pain. Patient updated on POC. All questions answered at this time. Call light within reach.

## 2023-10-22 NOTE — CM/SW NOTE
Dave Springer MD: Sudhir Hopper in by: Anca Snow  Call back #: [phone]  Date of admission: 10/22/2023  Diagnosis: UTI  Specialist MD:  ???  Hospitalist assigned: [email or web address]  Type of admission: INPT  Type of bed: Medical  Time of admission: 1230  Insurance Verification complete:  Yes

## 2023-10-23 LAB
ANION GAP SERPL CALC-SCNC: 6 MMOL/L (ref 0–18)
BASOPHILS # BLD AUTO: 0.04 X10(3) UL (ref 0–0.2)
BASOPHILS NFR BLD AUTO: 0.3 %
BUN BLD-MCNC: 35 MG/DL (ref 7–18)
CALCIUM BLD-MCNC: 7.6 MG/DL (ref 8.5–10.1)
CHLORIDE SERPL-SCNC: 116 MMOL/L (ref 98–112)
CO2 SERPL-SCNC: 21 MMOL/L (ref 21–32)
CREAT BLD-MCNC: 1.45 MG/DL
EGFRCR SERPLBLD CKD-EPI 2021: 49 ML/MIN/1.73M2 (ref 60–?)
EOSINOPHIL # BLD AUTO: 0.01 X10(3) UL (ref 0–0.7)
EOSINOPHIL NFR BLD AUTO: 0.1 %
ERYTHROCYTE [DISTWIDTH] IN BLOOD BY AUTOMATED COUNT: 12.9 %
GLUCOSE BLD-MCNC: 97 MG/DL (ref 70–99)
HCT VFR BLD AUTO: 35.4 %
HGB BLD-MCNC: 12.5 G/DL
IMM GRANULOCYTES # BLD AUTO: 0.29 X10(3) UL (ref 0–1)
IMM GRANULOCYTES NFR BLD: 2.1 %
LYMPHOCYTES # BLD AUTO: 0.36 X10(3) UL (ref 1–4)
LYMPHOCYTES NFR BLD AUTO: 2.6 %
MCH RBC QN AUTO: 33.8 PG (ref 26–34)
MCHC RBC AUTO-ENTMCNC: 35.3 G/DL (ref 31–37)
MCV RBC AUTO: 95.7 FL
MONOCYTES # BLD AUTO: 0.46 X10(3) UL (ref 0.1–1)
MONOCYTES NFR BLD AUTO: 3.3 %
NEUTROPHILS # BLD AUTO: 12.85 X10 (3) UL (ref 1.5–7.7)
NEUTROPHILS # BLD AUTO: 12.85 X10(3) UL (ref 1.5–7.7)
NEUTROPHILS NFR BLD AUTO: 91.6 %
OSMOLALITY SERPL CALC.SUM OF ELEC: 304 MOSM/KG (ref 275–295)
PLATELET # BLD AUTO: 73 10(3)UL (ref 150–450)
POTASSIUM SERPL-SCNC: 4 MMOL/L (ref 3.5–5.1)
RBC # BLD AUTO: 3.7 X10(6)UL
SODIUM SERPL-SCNC: 143 MMOL/L (ref 136–145)
WBC # BLD AUTO: 14 X10(3) UL (ref 4–11)

## 2023-10-23 PROCEDURE — 94640 AIRWAY INHALATION TREATMENT: CPT

## 2023-10-23 PROCEDURE — 85025 COMPLETE CBC W/AUTO DIFF WBC: CPT | Performed by: HOSPITALIST

## 2023-10-23 PROCEDURE — 80048 BASIC METABOLIC PNL TOTAL CA: CPT | Performed by: HOSPITALIST

## 2023-10-23 PROCEDURE — 94799 UNLISTED PULMONARY SVC/PX: CPT

## 2023-10-23 RX ORDER — SENNOSIDES 8.6 MG
1300 CAPSULE ORAL 2 TIMES DAILY
Status: DISCONTINUED | OUTPATIENT
Start: 2023-10-23 | End: 2023-10-23

## 2023-10-23 RX ORDER — ACETAMINOPHEN 325 MG/1
650 TABLET ORAL EVERY 6 HOURS
Status: DISCONTINUED | OUTPATIENT
Start: 2023-10-23 | End: 2023-10-25

## 2023-10-23 NOTE — PROGRESS NOTES
Assumed care of pt around 0730. Pt Aox4, VSS on RA. Tele SR. IVF. IV ABX. Seizure precautions in place. Pt has c/o pain, prn meds given and home pain regimen reordered. Pt up with assist. Voids. Pt and wife updated on poc.

## 2023-10-23 NOTE — PLAN OF CARE
Pt A&O X 4. Seizure precautions. Room air. IV Abx. A-Fib on Tele. Hypotensive, MD notified. See MAR. Voids. Bladder scan- 45ml. IVF. Regular diet. Up SBA. CT A/P done, see results. No further needs at this time.      Problem: Patient/Family Goals  Goal: Patient/Family Long Term Goal  Description: Patient's Long Term Goal: Discharge Home    Interventions:  - IV abx  - IV fluids  - See additional Care Plan goals for specific interventions  Outcome: Progressing  Goal: Patient/Family Short Term Goal  Description: Patient's Short Term Goal: Improve pain with urination     Interventions:   - IV abx   - IV fluids  - See additional Care Plan goals for specific interventions  Outcome: Progressing

## 2023-10-24 LAB
ANION GAP SERPL CALC-SCNC: 7 MMOL/L (ref 0–18)
BASOPHILS # BLD AUTO: 0.03 X10(3) UL (ref 0–0.2)
BASOPHILS NFR BLD AUTO: 0.3 %
BUN BLD-MCNC: 32 MG/DL (ref 7–18)
CALCIUM BLD-MCNC: 7.8 MG/DL (ref 8.5–10.1)
CHLORIDE SERPL-SCNC: 113 MMOL/L (ref 98–112)
CO2 SERPL-SCNC: 21 MMOL/L (ref 21–32)
CREAT BLD-MCNC: 0.94 MG/DL
EGFRCR SERPLBLD CKD-EPI 2021: 82 ML/MIN/1.73M2 (ref 60–?)
EOSINOPHIL # BLD AUTO: 0.1 X10(3) UL (ref 0–0.7)
EOSINOPHIL NFR BLD AUTO: 1.1 %
ERYTHROCYTE [DISTWIDTH] IN BLOOD BY AUTOMATED COUNT: 13 %
GLUCOSE BLD-MCNC: 89 MG/DL (ref 70–99)
HCT VFR BLD AUTO: 35.4 %
HGB BLD-MCNC: 12.2 G/DL
IMM GRANULOCYTES # BLD AUTO: 0.12 X10(3) UL (ref 0–1)
IMM GRANULOCYTES NFR BLD: 1.3 %
LYMPHOCYTES # BLD AUTO: 0.55 X10(3) UL (ref 1–4)
LYMPHOCYTES NFR BLD AUTO: 5.8 %
MCH RBC QN AUTO: 33.6 PG (ref 26–34)
MCHC RBC AUTO-ENTMCNC: 34.5 G/DL (ref 31–37)
MCV RBC AUTO: 97.5 FL
MONOCYTES # BLD AUTO: 0.39 X10(3) UL (ref 0.1–1)
MONOCYTES NFR BLD AUTO: 4.1 %
NEUTROPHILS # BLD AUTO: 8.28 X10 (3) UL (ref 1.5–7.7)
NEUTROPHILS # BLD AUTO: 8.28 X10(3) UL (ref 1.5–7.7)
NEUTROPHILS NFR BLD AUTO: 87.4 %
OSMOLALITY SERPL CALC.SUM OF ELEC: 298 MOSM/KG (ref 275–295)
PLATELET # BLD AUTO: 82 10(3)UL (ref 150–450)
POTASSIUM SERPL-SCNC: 3.6 MMOL/L (ref 3.5–5.1)
RBC # BLD AUTO: 3.63 X10(6)UL
SODIUM SERPL-SCNC: 141 MMOL/L (ref 136–145)
WBC # BLD AUTO: 9.5 X10(3) UL (ref 4–11)

## 2023-10-24 PROCEDURE — 85025 COMPLETE CBC W/AUTO DIFF WBC: CPT | Performed by: HOSPITALIST

## 2023-10-24 PROCEDURE — 80048 BASIC METABOLIC PNL TOTAL CA: CPT | Performed by: HOSPITALIST

## 2023-10-24 NOTE — PLAN OF CARE
10/24 a/ox4, denies any pain nor sob. Noted urine tea colored. Afebrile. Sinus demar HR=47-58 on tele. Lisa hightower md notiifed. On ivf , infusing well . On iv antibiotic, no appetite ,awaiting for blood culture. Call light within reach.   Problem: Patient/Family Goals  Goal: Patient/Family Long Term Goal  Description: Patient's Long Term Goal: Discharge Home    Interventions:  - IV abx  - IV fluids  - See additional Care Plan goals for specific interventions  Outcome: Progressing  Goal: Patient/Family Short Term Goal  Description: Patient's Short Term Goal: Improve pain with urination     Interventions:   - IV abx   - IV fluids  - See additional Care Plan goals for specific interventions  Outcome: Progressing

## 2023-10-24 NOTE — PROGRESS NOTES
AO x4. Seizure precautions in place. RA. Tele - NSR. Pradaxa. Urinal at bedside. Generalized pain, scheduled tylenol. Up x1 walker. Merrem. IVF infusing per MAR. Regular diet. Pt updated on POC. No further needs at this time.  Resting in bed with call light in reach

## 2023-10-25 VITALS
TEMPERATURE: 99 F | SYSTOLIC BLOOD PRESSURE: 142 MMHG | WEIGHT: 162 LBS | OXYGEN SATURATION: 94 % | RESPIRATION RATE: 17 BRPM | BODY MASS INDEX: 22 KG/M2 | DIASTOLIC BLOOD PRESSURE: 86 MMHG | HEART RATE: 51 BPM

## 2023-10-25 LAB — C DIFF TOX B STL QL: NEGATIVE

## 2023-10-25 PROCEDURE — 87493 C DIFF AMPLIFIED PROBE: CPT | Performed by: HOSPITALIST

## 2023-10-25 RX ORDER — CIPROFLOXACIN 500 MG/1
500 TABLET, FILM COATED ORAL 2 TIMES DAILY
Qty: 5 TABLET | Refills: 0 | Status: SHIPPED | OUTPATIENT
Start: 2023-10-25

## 2023-10-25 NOTE — DISCHARGE SUMMARY
General Medicine Discharge Summary     Patient ID:  Rupesh Mack  77year old  [id number]  1/12/1945    Admit date: 10/22/2023    Discharge date and time: 10/25/2023  4:32 PM     Attending Physician: Olive Wang MD    Primary Care Physician: Shannon Marion MD     Reason for admission: uti      Risk of readmission: Rupesh Mack has Moderate Risk of readmission after discharge from the hospital.    Hospital Discharge Diagnoses:  uti, sepsis    Lace+ Score: 62  59-90 High Risk  29-58 Medium Risk  0-28   Low Risk. TCM Follow-Up Recommendation:  LACE 29-58: Moderate Risk of readmission after discharge from the hospital.          Discharge Condition: alive    Important follow up  -PCP Shannon Marion MD see appointments listed below    -Labs: prn  -Radiology:  prn         Hospital Course:    76 yo man with h/o uti/sepsis, h/o bph per chart who presents with UTI and severe sepsis     1) UTI with severe sepsis (resolved)- UC at [facility] with Klebsiella pneumoniae- sensitivities reivewed  - ?2/2 h/o bph  - CT abd done without evidence of abscess or pyelonephritis. Bladder scan neg. Pt should still f/u with urology as outpt, has seen them in the past. Had a previous episode of pyelonephritis with septic shock in 2016.   - blood cultures here ntd  -on meropenem--> po abx upon dc based on sensitivities  - wbc improving.  Clinically improving     2) h/o afib, cardiomyopathy- cont coreg, pradaxa, statin     3) h/o seizures- on keppra     4) thrombocytopenia- likely 2/2 sepsis, doing better     5) yoli- 2/2 above, resolved  Consults: IP CONSULT TO FOOD AND NUTRITION SERVICES  NURSING CONSULT TO DIETITIAN    Radiology:  CT ABDOMEN+PELVIS KIDNEYSTONE 2D RNDR(NO IV,NO ORAL)(CPT=74176)    Result Date: 10/22/2023  PROCEDURE:  CT ABDOMEN+PELVIS KIDNEYSTONE 2D RNDR(NO IV,NO ORAL)(CPT=74176)  COMPARISON:  [facility] , CT, CT ABDOMEN+PELVIS KIDNEYSTONE 2D RNDR(NO IV,NO ORAL)(CPT=74176), 8/18/2016, 10:38 PM.  INDICATIONS:  uti, sepsis  TECHNIQUE:  Unenhanced multislice CT scanning from above the kidneys to below the urinary bladder. 2D rendering are generated on the CT scanner workstation to localize potential stones in the cranio-caudal plane. Dose reduction techniques were used. Dose information is transmitted to the [facility]) NRDR ([address]) which includes the Dose Index Registry. PATIENT STATED HISTORY: (As transcribed by Technologist)  UTI, SEPSIS. C/O DYSURIA & OCCASIONAL GROSS HEMATURIA. HX OF PRIOR HERNIA REPAIR. FINDINGS:   KIDNEYS/BLADDER:  Negative for kidney stone, ureteral or bladder stone, hydronephrosis or hydroureter. No perinephric stranding, or asymmetry in renal size. Although the urinary bladder is not well distended, even for this degree of distention, the wall  thickness is greater than typical and there is also stranding of the fat around the bladder. Observations of the below areas/structures, along with any assessment of a normal or unremarkable appearance, is based on the appearance of the anatomical structures on NONCONTRAST CT exam tailored for urinary tract imaging, with inherent limitations thereof. ADRENALS:  Unremarkable. LIVER:  Unremarkable. BILIARY:  Unremarkable. PANCREAS:  Unremarkable. SPLEEN:  Unremarkable. AORTA/VASCULAR:  No aortic aneurysm. RETROPERITONEUM:  Unremarkable. BOWEL/MESENTERY:  No acute process. ABDOMINAL WALL:  Unremarkable. PELVIC ORGANS:  Unremarkable. LUNG BASES:  No acute disease process. Scarring at the lung base. BONES:  No acute process seen. Postsurgical and degenerative changes the spine partially seen spine stimulator. CONCLUSION:  Probable cystitis. No kidney stone or hydronephrosis.      LOCATION:  [facility]   Dictated by (CST): Geraldine Schreiber MD on 10/22/2023 at 7:24 PM     Finalized by (CST): Geraldine Schreiber MD on 10/22/2023 at 7:26 PM         Operative Procedures: Disposition: alive    Patient Instructions: Current and discharge medications reviewed with patient  Discharge Medication List as of 10/25/2023  1:40 PM    START taking these medications    ciprofloxacin 500 MG Oral Tab  Take 1 tablet (500 mg total) by mouth 2 (two) times daily. , Normal, Disp-5 tablet, R-0      CONTINUE these medications which have NOT CHANGED    Acetaminophen ER (ARTHRITIS PAIN RELIEF) 650 MG Oral Tab CR  Take 2 tablets (1,300 mg total) by mouth 2 (two) times daily. , Historical    levETIRAcetam 500 MG Oral Tab  Take 1.5 tabs by mouth BID, Normal, Disp-270 tablet, R-3    Multiple Vitamin (MULTIVITAMIN ADULT OR)  Take by mouth., Historical    PANTOPRAZOLE 40 MG Oral Tab EC  TAKE 1 TABLET BY MOUTH EVERY MORNING BEFORE BREAKFAST, Normal, Disp-90 tablet, R-0MUST BE SEEN FOR ANY FURTHER REFILLS. dabigatran Etexilate Mesylate (PRADAXA) 150 MG Oral Cap  TAKE 1 CAPSULE(150 MG) BY MOUTH TWICE DAILY, Normal, Disp-180 capsule, R-2    carvedilol 3.125 MG Oral Tab  Take 1 tablet (3.125 mg total) by mouth 2 (two) times daily with meals. , Normal, Disp-180 tablet, R-3    ROSUVASTATIN 10 MG Oral Tab  TAKE 1 TABLET(10 MG) BY MOUTH EVERY NIGHT, Normal, Disp-90 tablet, R-2    Fluticasone Furoate-Vilanterol (BREO ELLIPTA) 100-25 MCG/INH Inhalation Aerosol Powder, Breath Activated  Inhale 1 puff into the lungs daily. , Normal, Disp-3 each, R-3    Ascorbic Acid (VITAMIN C OR)  Take 1 tablet by mouth daily. , Historical    Ergocalciferol (VITAMIN D OR)  Take 1 tablet by mouth daily. , Historical    Fluticasone Propionate (FLONASE) 50 MCG/ACT Nasal Suspension  2 sprays by Nasal route 2 (two) times daily. , Normal, Disp-3 Bottle, R-3    FIBERCON 625 MG OR TABS  1 TABLET PO DAILY, Historical      STOP taking these medications    Omeprazole 10 MG Oral Capsule Delayed Release          Home Medication Changes:      Activity: as directed  Diet: as directed  Wound Care:  prn  Code Status: Full Code  O2: prn    Follow-up with Follow up with Michelle Padgett in 6 day(s)  Specialty: Internal Medicine  appointment scheduled for 10/31 @ [address]  [address] [place] [address] [phone]      urology as directed (pt wants to f/u with own urologist)          Total Time Coordinating Care: 35 minutes    Patient had opportunity to ask questions and state understand and agree with therapeutic plan as outlined    Thank You,  Alex Kasper, [address] [place] [address]  Internal Medicine  Answering Service number: [phone]

## (undated) DEVICE — AIRLIFE&#8482 MISTY MAX 10 NEBULIZER W 7 (2.1 M) CRUSH RESISTANT OXYGEN TUBING BAFFLED TEE ADAPTER, MOUTHPIECE: Brand: AIRLIFE

## (undated) DEVICE — FLOSEAL HEMOSTATIC MATRIX, 5ML: Brand: FLOSEAL HEMOSTATIC MATRIX

## (undated) DEVICE — TRANSPOSAL ULTRAFLEX DUO/QUAD ULTRA CART MANIFOLD

## (undated) DEVICE — 60 ML SYRINGE REGULAR TIP: Brand: MONOJECT

## (undated) DEVICE — MASK ISOLATION

## (undated) DEVICE — 3.0MM PRECISION ROUND

## (undated) DEVICE — SUTURE VICRYL 2-0 FSL

## (undated) DEVICE — WIRE FX PRCPT KRSH BVL BLNT

## (undated) DEVICE — 3M™ MICROFOAM™ TAPE 1528-4: Brand: 3M™ MICROFOAM™

## (undated) DEVICE — UNDYED BRAIDED (POLYGLACTIN 910), SYNTHETIC ABSORBABLE SUTURE: Brand: COATED VICRYL

## (undated) DEVICE — KENDALL SCD EXPRESS SLEEVES, THIGH LENGTH, MEDIUM: Brand: KENDALL SCD

## (undated) DEVICE — NV I-PAS III DIAMOND TIP

## (undated) DEVICE — LIGHT HANDLE

## (undated) DEVICE — COVER,MAYO STAND,STERILE: Brand: MEDLINE

## (undated) DEVICE — SOL  .9 1000ML BTL

## (undated) DEVICE — SINGLE USE BIOPSY VALVE MAJ-210: Brand: SINGLE USE BIOPSY VALVE (STERILE)

## (undated) DEVICE — Device

## (undated) DEVICE — SUTURE VICRYL 1 OS-6

## (undated) DEVICE — 11.1-M5 MULTIMODALITY KIT 5

## (undated) DEVICE — BOWLS UTILITY 16OZ

## (undated) DEVICE — SYRINGE 10ML SLIP TIP

## (undated) DEVICE — ENDOSCOPY PACK UPPER: Brand: MEDLINE INDUSTRIES, INC.

## (undated) DEVICE — C-ARMOR C-ARM EQUIPMENT COVERS CLEAR STERILE UNIVERSAL FIT 12 PER CASE: Brand: C-ARMOR

## (undated) DEVICE — RELINE MAS RED SCRW 6.5X50 2C
Type: IMPLANTABLE DEVICE | Site: BACK | Status: NON-FUNCTIONAL
Removed: 2019-04-18

## (undated) DEVICE — DRAPE C-ARM UNIVERSAL

## (undated) DEVICE — MASK ETCO2 PANORAMIC

## (undated) DEVICE — MEDI-VAC SUCTION HANDLE REGULAR CAPACITY: Brand: CARDINAL HEALTH

## (undated) DEVICE — SINGLE USE SUCTION VALVE MAJ-209: Brand: SINGLE USE SUCTION VALVE (STERILE)

## (undated) DEVICE — GRAFT DELIVERY SYS MODULE

## (undated) DEVICE — SPECIMEN TRAP LUKI

## (undated) DEVICE — 3M™ RED DOT™ MONITORING ELECTRODE WITH FOAM TAPE AND STICKY GEL, 50/BAG, 20/CASE, 72/PLT 2570: Brand: RED DOT™

## (undated) DEVICE — DRILL SRG OIL CRTDG MAESTRO

## (undated) DEVICE — FILTERLINE NASAL ADULT O2/CO2

## (undated) DEVICE — MAXCESS MAS TLIF 2 KIT ACCESS

## (undated) DEVICE — MICROMYST APPLICATOR (14CM) 5 PACK - FOR USE WITH FLOW REGULATOR: Brand: MICROMYST

## (undated) DEVICE — MEDI-VAC NON-CONDUCTIVE SUCTION TUBING: Brand: CARDINAL HEALTH

## (undated) DEVICE — BLDE PRCPT FSCL SPLTR DISP

## (undated) DEVICE — LAMINECTOMY CDS: Brand: MEDLINE INDUSTRIES, INC.

## (undated) DEVICE — DRAPE TABLE COVER 44X90 TC-10

## (undated) DEVICE — STERILE POLYISOPRENE POWDER-FREE SURGICAL GLOVES: Brand: PROTEXIS

## (undated) DEVICE — 1200CC GUARDIAN II: Brand: GUARDIAN

## (undated) DEVICE — NV CLIP DSC&IN-LINE ACTIVATOR

## (undated) DEVICE — NUVAMAP O.R. TECHNOLOGY

## (undated) DEVICE — WRAP COOLING BACK W/NO PILLOW

## (undated) DEVICE — TRAP SPECIMEN MUCOUS 70 CUBIC CENTIMETER POLYURETHANE STERILE NOT MADE WITH NATURAL RUBBER LATEX MEDICHOICE: Brand: MEDICHOICE

## (undated) DEVICE — RELINE POWER

## (undated) NOTE — LETTER
Tiana Cody [address] [address]  [place], [address] [facility]    Consent for Operation  Date: __________________                                Time: _______________    1.  I authorize the performance upon Ramya Howell the following operation:  Procedure(s): procedure has been videotaped, the surgeon will obtain the original videotape. The hospital will not be responsible for storage or maintenance of this tape.   7. For the purpose of advancing medical education, I consent to the admittance of observers to the STATEMENTS REQUIRING INSERTION OR COMPLETION WERE FILLED IN.     Signature of Patient:   ___________________________    When the patient is a minor or mentally incompetent to give consent:  Signature of person authorized to consent for patient: ____________ drugs/illegal medications). Failure to inform my anesthesiologist about these medicines may increase my risk of anesthetic complications. iv. If I am allergic to anything or have had a reaction to anesthesia before.   3. I understand how the anesthesia med I have discussed the procedure and information above with the patient (or patient’s representative) and answered their questions. The patient or their representative has agreed to have anesthesia services.     _______________________________________________

## (undated) NOTE — LETTER
[facility]  Phone: [phone]  OUTSIDE TESTING RESULT REQUEST      TO:   Ron Wei Date: 3/25/19    FAX #: [phone]     IMPORTANT: FOR YOUR IMMEDIATE ATTENTION  Please FAX all test results listed below to: [phone]

## (undated) NOTE — ED AVS SNAPSHOT
Ramya Dante   MRN: [id number]    Department:  [facility] Emergency Department   Date of Visit:  1/10/2019           Disclosure     Insurance plans vary and the physician(s) referred by the ER may not be covered by your plan.  Please contact your ins tell this physician (or your personal doctor if your instructions are to return to your personal doctor) about any new or lasting problems. The primary care or specialist physician will see patients referred from the [facility] Emergency Department.  Arthor Bernheim

## (undated) NOTE — LETTER
[facility]  Phone: [phone]  Right Fax: [phone]  [facility] 20 By:  Dr. Nancy Reynolds    Date: 19    Patient Name: Gelene Courser  Surgery Date: 2019    CSN: [id number]  Medical Record: [id number]   : 1/

## (undated) NOTE — LETTER
[facility] Testing Department  Phone: [phone]  Right Fax: [phone]  [facility] [address] Ever Bales RN Date: 19    Patient Name: Giovanni Crowley  Surgery Date: 2019    CSN: [id number]  Medical Record: [id number]   : /